# Patient Record
Sex: FEMALE | Race: WHITE | Employment: PART TIME | URBAN - METROPOLITAN AREA
[De-identification: names, ages, dates, MRNs, and addresses within clinical notes are randomized per-mention and may not be internally consistent; named-entity substitution may affect disease eponyms.]

---

## 2017-08-01 ENCOUNTER — HOSPITAL ENCOUNTER (OUTPATIENT)
Age: 57
Discharge: HOME | End: 2017-08-01
Payer: MEDICAID

## 2017-08-01 LAB — RUBV IGG SER QL: 44.4 IU/ML

## 2017-08-01 PROCEDURE — 86762 RUBELLA ANTIBODY: CPT

## 2017-08-01 PROCEDURE — 86481 TB AG RESPONSE T-CELL SUSP: CPT

## 2017-08-01 PROCEDURE — 86765 RUBEOLA ANTIBODY: CPT

## 2017-08-01 PROCEDURE — 86787 VARICELLA-ZOSTER ANTIBODY: CPT

## 2017-08-01 PROCEDURE — 86735 MUMPS ANTIBODY: CPT

## 2017-08-03 LAB — MEASLES IMMUNE (IGG): 1.13

## 2017-08-04 LAB
MUV IGG SER QL: 4.25
T-SPOT TB TEST: NORMAL
VZV IGG SER QL IA: 3.7

## 2017-08-07 ENCOUNTER — HOSPITAL ENCOUNTER (OUTPATIENT)
Age: 57
Discharge: HOME | End: 2017-08-07
Payer: MEDICAID

## 2017-08-07 PROCEDURE — 86481 TB AG RESPONSE T-CELL SUSP: CPT

## 2017-08-10 LAB — T-SPOT TB TEST: NORMAL

## 2017-08-30 ENCOUNTER — HOSPITAL ENCOUNTER (OUTPATIENT)
Age: 57
Setting detail: OBSERVATION
Discharge: HOME | End: 2017-08-31
Attending: EMERGENCY MEDICINE | Admitting: INTERNAL MEDICINE
Payer: MEDICAID

## 2017-08-30 ENCOUNTER — APPOINTMENT (OUTPATIENT)
Dept: GENERAL RADIOLOGY | Age: 57
End: 2017-08-30
Payer: MEDICAID

## 2017-08-30 DIAGNOSIS — R07.9 CHEST PAIN, UNSPECIFIED TYPE: Primary | ICD-10-CM

## 2017-08-30 PROBLEM — I10 HTN (HYPERTENSION): Status: ACTIVE | Noted: 2017-08-30

## 2017-08-30 PROBLEM — E03.9 HYPOTHYROIDISM: Status: ACTIVE | Noted: 2017-08-30

## 2017-08-30 PROBLEM — K21.9 GERD (GASTROESOPHAGEAL REFLUX DISEASE): Status: ACTIVE | Noted: 2017-08-30

## 2017-08-30 LAB
ABSOLUTE EOS #: 0.4 K/UL (ref 0–0.4)
ABSOLUTE LYMPH #: 1.4 K/UL (ref 1–4.8)
ABSOLUTE MONO #: 0.6 K/UL (ref 0.1–1.3)
AMPHETAMINE SCREEN URINE: NEGATIVE
ANION GAP SERPL CALCULATED.3IONS-SCNC: 15 MMOL/L (ref 9–17)
BARBITURATE SCREEN URINE: NEGATIVE
BASOPHILS # BLD: 1 %
BASOPHILS ABSOLUTE: 0.1 K/UL (ref 0–0.2)
BENZODIAZEPINE SCREEN, URINE: NEGATIVE
BUN BLDV-MCNC: 20 MG/DL (ref 6–20)
BUN/CREAT BLD: ABNORMAL (ref 9–20)
BUPRENORPHINE URINE: NORMAL
CALCIUM SERPL-MCNC: 9 MG/DL (ref 8.6–10.4)
CANNABINOID SCREEN URINE: NEGATIVE
CHLORIDE BLD-SCNC: 100 MMOL/L (ref 98–107)
CHOLESTEROL/HDL RATIO: 3
CHOLESTEROL: 172 MG/DL
CO2: 24 MMOL/L (ref 20–31)
COCAINE METABOLITE, URINE: NEGATIVE
CREAT SERPL-MCNC: 0.79 MG/DL (ref 0.5–0.9)
DIFFERENTIAL TYPE: NORMAL
EOSINOPHILS RELATIVE PERCENT: 6 %
GFR AFRICAN AMERICAN: >60 ML/MIN
GFR NON-AFRICAN AMERICAN: >60 ML/MIN
GFR SERPL CREATININE-BSD FRML MDRD: ABNORMAL ML/MIN/{1.73_M2}
GFR SERPL CREATININE-BSD FRML MDRD: ABNORMAL ML/MIN/{1.73_M2}
GLUCOSE BLD-MCNC: 102 MG/DL (ref 70–99)
HCT VFR BLD CALC: 41.1 % (ref 36–46)
HDLC SERPL-MCNC: 58 MG/DL
HEMOGLOBIN: 13.8 G/DL (ref 12–16)
LDL CHOLESTEROL: 87 MG/DL (ref 0–130)
LYMPHOCYTES # BLD: 21 %
MCH RBC QN AUTO: 29.1 PG (ref 26–34)
MCHC RBC AUTO-ENTMCNC: 33.7 G/DL (ref 31–37)
MCV RBC AUTO: 86.3 FL (ref 80–100)
MDMA URINE: NORMAL
METHADONE SCREEN, URINE: NEGATIVE
METHAMPHETAMINE, URINE: NORMAL
MONOCYTES # BLD: 9 %
MYOGLOBIN: 21 NG/ML (ref 25–58)
MYOGLOBIN: 21 NG/ML (ref 25–58)
OPIATES, URINE: NEGATIVE
OXYCODONE SCREEN URINE: NEGATIVE
PDW BLD-RTO: 13.7 % (ref 11.5–14.9)
PHENCYCLIDINE, URINE: NEGATIVE
PLATELET # BLD: 267 K/UL (ref 150–450)
PLATELET ESTIMATE: NORMAL
PMV BLD AUTO: 8.2 FL (ref 6–12)
POTASSIUM SERPL-SCNC: 3.7 MMOL/L (ref 3.7–5.3)
PROPOXYPHENE, URINE: NORMAL
RBC # BLD: 4.77 M/UL (ref 4–5.2)
RBC # BLD: NORMAL 10*6/UL
SEG NEUTROPHILS: 63 %
SEGMENTED NEUTROPHILS ABSOLUTE COUNT: 4.1 K/UL (ref 1.3–9.1)
SODIUM BLD-SCNC: 139 MMOL/L (ref 135–144)
TEST INFORMATION: NORMAL
TRICYCLIC ANTIDEPRESSANTS, UR: NORMAL
TRIGL SERPL-MCNC: 135 MG/DL
TROPONIN INTERP: ABNORMAL
TROPONIN INTERP: ABNORMAL
TROPONIN INTERP: NORMAL
TROPONIN T: <0.03 NG/ML
VLDLC SERPL CALC-MCNC: NORMAL MG/DL (ref 1–30)
WBC # BLD: 6.5 K/UL (ref 3.5–11)
WBC # BLD: NORMAL 10*3/UL

## 2017-08-30 PROCEDURE — 36415 COLL VENOUS BLD VENIPUNCTURE: CPT

## 2017-08-30 PROCEDURE — 6360000002 HC RX W HCPCS: Performed by: STUDENT IN AN ORGANIZED HEALTH CARE EDUCATION/TRAINING PROGRAM

## 2017-08-30 PROCEDURE — 6370000000 HC RX 637 (ALT 250 FOR IP): Performed by: EMERGENCY MEDICINE

## 2017-08-30 PROCEDURE — 83036 HEMOGLOBIN GLYCOSYLATED A1C: CPT

## 2017-08-30 PROCEDURE — 96372 THER/PROPH/DIAG INJ SC/IM: CPT

## 2017-08-30 PROCEDURE — 85025 COMPLETE CBC W/AUTO DIFF WBC: CPT

## 2017-08-30 PROCEDURE — 83874 ASSAY OF MYOGLOBIN: CPT

## 2017-08-30 PROCEDURE — 80048 BASIC METABOLIC PNL TOTAL CA: CPT

## 2017-08-30 PROCEDURE — 99220 PR INITIAL OBSERVATION CARE/DAY 70 MINUTES: CPT | Performed by: INTERNAL MEDICINE

## 2017-08-30 PROCEDURE — 6370000000 HC RX 637 (ALT 250 FOR IP): Performed by: RADIOLOGY

## 2017-08-30 PROCEDURE — 2580000003 HC RX 258: Performed by: EMERGENCY MEDICINE

## 2017-08-30 PROCEDURE — 6370000000 HC RX 637 (ALT 250 FOR IP): Performed by: STUDENT IN AN ORGANIZED HEALTH CARE EDUCATION/TRAINING PROGRAM

## 2017-08-30 PROCEDURE — 99285 EMERGENCY DEPT VISIT HI MDM: CPT

## 2017-08-30 PROCEDURE — 93005 ELECTROCARDIOGRAM TRACING: CPT

## 2017-08-30 PROCEDURE — 84484 ASSAY OF TROPONIN QUANT: CPT

## 2017-08-30 PROCEDURE — G0378 HOSPITAL OBSERVATION PER HR: HCPCS

## 2017-08-30 PROCEDURE — 71020 XR CHEST STANDARD TWO VW: CPT

## 2017-08-30 PROCEDURE — 80061 LIPID PANEL: CPT

## 2017-08-30 PROCEDURE — 80307 DRUG TEST PRSMV CHEM ANLYZR: CPT

## 2017-08-30 RX ORDER — SODIUM CHLORIDE 0.9 % (FLUSH) 0.9 %
10 SYRINGE (ML) INJECTION EVERY 12 HOURS SCHEDULED
Status: DISCONTINUED | OUTPATIENT
Start: 2017-08-30 | End: 2017-08-30

## 2017-08-30 RX ORDER — ONDANSETRON 2 MG/ML
4 INJECTION INTRAMUSCULAR; INTRAVENOUS EVERY 6 HOURS PRN
Status: DISCONTINUED | OUTPATIENT
Start: 2017-08-30 | End: 2017-08-31 | Stop reason: HOSPADM

## 2017-08-30 RX ORDER — 0.9 % SODIUM CHLORIDE 0.9 %
250 INTRAVENOUS SOLUTION INTRAVENOUS ONCE
Status: DISCONTINUED | OUTPATIENT
Start: 2017-08-31 | End: 2017-08-31 | Stop reason: HOSPADM

## 2017-08-30 RX ORDER — LEVOTHYROXINE SODIUM 0.05 MG/1
50 TABLET ORAL DAILY
Status: DISCONTINUED | OUTPATIENT
Start: 2017-08-31 | End: 2017-08-31 | Stop reason: HOSPADM

## 2017-08-30 RX ORDER — SODIUM CHLORIDE 0.9 % (FLUSH) 0.9 %
10 SYRINGE (ML) INJECTION EVERY 12 HOURS SCHEDULED
Status: DISCONTINUED | OUTPATIENT
Start: 2017-08-30 | End: 2017-08-31 | Stop reason: HOSPADM

## 2017-08-30 RX ORDER — GENTAMICIN SULFATE 1 MG/G
1 CREAM TOPICAL 3 TIMES DAILY PRN
COMMUNITY

## 2017-08-30 RX ORDER — SODIUM CHLORIDE 0.9 % (FLUSH) 0.9 %
10 SYRINGE (ML) INJECTION PRN
Status: DISCONTINUED | OUTPATIENT
Start: 2017-08-30 | End: 2017-08-30

## 2017-08-30 RX ORDER — ACETAMINOPHEN 325 MG/1
650 TABLET ORAL EVERY 4 HOURS PRN
Status: DISCONTINUED | OUTPATIENT
Start: 2017-08-30 | End: 2017-08-30

## 2017-08-30 RX ORDER — ACETAMINOPHEN 325 MG/1
650 TABLET ORAL EVERY 4 HOURS PRN
Status: DISCONTINUED | OUTPATIENT
Start: 2017-08-30 | End: 2017-08-31 | Stop reason: HOSPADM

## 2017-08-30 RX ORDER — METOPROLOL TARTRATE 5 MG/5ML
2.5 INJECTION INTRAVENOUS PRN
Status: DISCONTINUED | OUTPATIENT
Start: 2017-08-31 | End: 2017-08-31 | Stop reason: SDUPTHER

## 2017-08-30 RX ORDER — 0.9 % SODIUM CHLORIDE 0.9 %
500 INTRAVENOUS SOLUTION INTRAVENOUS ONCE
Status: COMPLETED | OUTPATIENT
Start: 2017-08-30 | End: 2017-08-30

## 2017-08-30 RX ORDER — DULOXETIN HYDROCHLORIDE 60 MG/1
60 CAPSULE, DELAYED RELEASE ORAL DAILY
COMMUNITY

## 2017-08-30 RX ORDER — AMINOPHYLLINE DIHYDRATE 25 MG/ML
100 INJECTION, SOLUTION INTRAVENOUS
Status: DISCONTINUED | OUTPATIENT
Start: 2017-08-31 | End: 2017-08-31 | Stop reason: HOSPADM

## 2017-08-30 RX ORDER — PANTOPRAZOLE SODIUM 40 MG/1
40 GRANULE, DELAYED RELEASE ORAL DAILY
Status: DISCONTINUED | OUTPATIENT
Start: 2017-08-30 | End: 2017-08-30

## 2017-08-30 RX ORDER — LEVOFLOXACIN 500 MG/1
500 TABLET, FILM COATED ORAL DAILY
Status: DISCONTINUED | OUTPATIENT
Start: 2017-08-30 | End: 2017-08-31 | Stop reason: HOSPADM

## 2017-08-30 RX ORDER — FLUTICASONE PROPIONATE 50 MCG
2 SPRAY, SUSPENSION (ML) NASAL DAILY
Status: DISCONTINUED | OUTPATIENT
Start: 2017-08-30 | End: 2017-08-31 | Stop reason: HOSPADM

## 2017-08-30 RX ORDER — OMEPRAZOLE 20 MG/1
40 CAPSULE, DELAYED RELEASE ORAL DAILY
COMMUNITY

## 2017-08-30 RX ORDER — PANTOPRAZOLE SODIUM 40 MG/1
40 TABLET, DELAYED RELEASE ORAL
Status: DISCONTINUED | OUTPATIENT
Start: 2017-08-31 | End: 2017-08-31 | Stop reason: HOSPADM

## 2017-08-30 RX ORDER — SODIUM CHLORIDE 0.9 % (FLUSH) 0.9 %
10 SYRINGE (ML) INJECTION PRN
Status: DISCONTINUED | OUTPATIENT
Start: 2017-08-30 | End: 2017-08-31 | Stop reason: SDUPTHER

## 2017-08-30 RX ORDER — SIMVASTATIN 40 MG
40 TABLET ORAL NIGHTLY
Status: DISCONTINUED | OUTPATIENT
Start: 2017-08-30 | End: 2017-08-31 | Stop reason: HOSPADM

## 2017-08-30 RX ORDER — LISINOPRIL 20 MG/1
20 TABLET ORAL DAILY
Status: DISCONTINUED | OUTPATIENT
Start: 2017-08-30 | End: 2017-08-31 | Stop reason: HOSPADM

## 2017-08-30 RX ORDER — ASPIRIN 81 MG/1
324 TABLET, CHEWABLE ORAL ONCE
Status: COMPLETED | OUTPATIENT
Start: 2017-08-30 | End: 2017-08-30

## 2017-08-30 RX ORDER — NITROGLYCERIN 0.4 MG/1
0.4 TABLET SUBLINGUAL EVERY 5 MIN PRN
Status: DISCONTINUED | OUTPATIENT
Start: 2017-08-31 | End: 2017-08-31 | Stop reason: SDUPTHER

## 2017-08-30 RX ADMIN — LEVOFLOXACIN 500 MG: 500 TABLET, FILM COATED ORAL at 21:22

## 2017-08-30 RX ADMIN — ENOXAPARIN SODIUM 30 MG: 30 INJECTION SUBCUTANEOUS at 21:22

## 2017-08-30 RX ADMIN — LISINOPRIL 20 MG: 20 TABLET ORAL at 21:23

## 2017-08-30 RX ADMIN — SIMVASTATIN 40 MG: 40 TABLET, FILM COATED ORAL at 21:22

## 2017-08-30 RX ADMIN — FLUTICASONE PROPIONATE 2 SPRAY: 50 SPRAY, METERED NASAL at 21:26

## 2017-08-30 RX ADMIN — SODIUM CHLORIDE 500 ML: 9 INJECTION, SOLUTION INTRAVENOUS at 17:40

## 2017-08-30 RX ADMIN — ASPIRIN 81 MG 324 MG: 81 TABLET ORAL at 13:12

## 2017-08-30 RX ADMIN — ACETAMINOPHEN 650 MG: 325 TABLET ORAL at 21:22

## 2017-08-30 ASSESSMENT — ENCOUNTER SYMPTOMS
BACK PAIN: 0
SHORTNESS OF BREATH: 0
NAUSEA: 0
ABDOMINAL PAIN: 0
EYE PAIN: 0
COUGH: 0
VOMITING: 0
SORE THROAT: 0
DIARRHEA: 0

## 2017-08-30 ASSESSMENT — PAIN DESCRIPTION - PAIN TYPE: TYPE: ACUTE PAIN

## 2017-08-30 ASSESSMENT — PAIN SCALES - GENERAL
PAINLEVEL_OUTOF10: 0
PAINLEVEL_OUTOF10: 3
PAINLEVEL_OUTOF10: 3

## 2017-08-30 ASSESSMENT — PAIN DESCRIPTION - LOCATION: LOCATION: CHEST

## 2017-08-31 ENCOUNTER — APPOINTMENT (OUTPATIENT)
Dept: NUCLEAR MEDICINE | Age: 57
End: 2017-08-31
Payer: MEDICAID

## 2017-08-31 VITALS
OXYGEN SATURATION: 99 % | DIASTOLIC BLOOD PRESSURE: 57 MMHG | TEMPERATURE: 97.8 F | SYSTOLIC BLOOD PRESSURE: 139 MMHG | HEIGHT: 63 IN | HEART RATE: 83 BPM | WEIGHT: 235.89 LBS | BODY MASS INDEX: 41.8 KG/M2 | RESPIRATION RATE: 16 BRPM

## 2017-08-31 LAB
ESTIMATED AVERAGE GLUCOSE: 120 MG/DL
HBA1C MFR BLD: 5.8 % (ref 4–6)
LV EF: 69 %
LVEF MODALITY: NORMAL

## 2017-08-31 PROCEDURE — 6360000002 HC RX W HCPCS: Performed by: STUDENT IN AN ORGANIZED HEALTH CARE EDUCATION/TRAINING PROGRAM

## 2017-08-31 PROCEDURE — 78452 HT MUSCLE IMAGE SPECT MULT: CPT

## 2017-08-31 PROCEDURE — A9500 TC99M SESTAMIBI: HCPCS | Performed by: RADIOLOGY

## 2017-08-31 PROCEDURE — 3430000000 HC RX DIAGNOSTIC RADIOPHARMACEUTICAL: Performed by: RADIOLOGY

## 2017-08-31 PROCEDURE — 6370000000 HC RX 637 (ALT 250 FOR IP): Performed by: RADIOLOGY

## 2017-08-31 PROCEDURE — 2580000003 HC RX 258: Performed by: INTERNAL MEDICINE

## 2017-08-31 PROCEDURE — 93017 CV STRESS TEST TRACING ONLY: CPT

## 2017-08-31 PROCEDURE — 6360000002 HC RX W HCPCS: Performed by: INTERNAL MEDICINE

## 2017-08-31 PROCEDURE — 3430000000 HC RX DIAGNOSTIC RADIOPHARMACEUTICAL: Performed by: INTERNAL MEDICINE

## 2017-08-31 PROCEDURE — 6370000000 HC RX 637 (ALT 250 FOR IP): Performed by: NURSE PRACTITIONER

## 2017-08-31 PROCEDURE — A9500 TC99M SESTAMIBI: HCPCS | Performed by: INTERNAL MEDICINE

## 2017-08-31 PROCEDURE — 99217 PR OBSERVATION CARE DISCHARGE MANAGEMENT: CPT | Performed by: INTERNAL MEDICINE

## 2017-08-31 PROCEDURE — 96372 THER/PROPH/DIAG INJ SC/IM: CPT

## 2017-08-31 PROCEDURE — G0378 HOSPITAL OBSERVATION PER HR: HCPCS

## 2017-08-31 PROCEDURE — 6370000000 HC RX 637 (ALT 250 FOR IP): Performed by: STUDENT IN AN ORGANIZED HEALTH CARE EDUCATION/TRAINING PROGRAM

## 2017-08-31 RX ORDER — DICLOFENAC SODIUM 75 MG/1
75 TABLET, DELAYED RELEASE ORAL 2 TIMES DAILY
Status: DISCONTINUED | OUTPATIENT
Start: 2017-08-31 | End: 2017-08-31 | Stop reason: HOSPADM

## 2017-08-31 RX ORDER — NITROGLYCERIN 0.4 MG/1
0.4 TABLET SUBLINGUAL EVERY 5 MIN PRN
Status: DISCONTINUED | OUTPATIENT
Start: 2017-08-31 | End: 2017-08-31 | Stop reason: HOSPADM

## 2017-08-31 RX ORDER — SODIUM CHLORIDE 0.9 % (FLUSH) 0.9 %
10 SYRINGE (ML) INJECTION PRN
Status: DISCONTINUED | OUTPATIENT
Start: 2017-08-31 | End: 2017-08-31 | Stop reason: HOSPADM

## 2017-08-31 RX ORDER — LEVOFLOXACIN 500 MG/1
500 TABLET, FILM COATED ORAL DAILY
Qty: 5 TABLET | Refills: 0 | Status: SHIPPED | OUTPATIENT
Start: 2017-09-01 | End: 2017-09-06

## 2017-08-31 RX ORDER — BUMETANIDE 1 MG/1
1 TABLET ORAL DAILY
Status: DISCONTINUED | OUTPATIENT
Start: 2017-08-31 | End: 2017-08-31 | Stop reason: HOSPADM

## 2017-08-31 RX ORDER — 0.9 % SODIUM CHLORIDE 0.9 %
250 INTRAVENOUS SOLUTION INTRAVENOUS ONCE
Status: DISCONTINUED | OUTPATIENT
Start: 2017-08-31 | End: 2017-08-31 | Stop reason: HOSPADM

## 2017-08-31 RX ORDER — DULOXETIN HYDROCHLORIDE 60 MG/1
60 CAPSULE, DELAYED RELEASE ORAL DAILY
Status: DISCONTINUED | OUTPATIENT
Start: 2017-08-31 | End: 2017-08-31 | Stop reason: HOSPADM

## 2017-08-31 RX ORDER — AMINOPHYLLINE DIHYDRATE 25 MG/ML
100 INJECTION, SOLUTION INTRAVENOUS
Status: COMPLETED | OUTPATIENT
Start: 2017-08-31 | End: 2017-08-31

## 2017-08-31 RX ORDER — METOPROLOL TARTRATE 5 MG/5ML
2.5 INJECTION INTRAVENOUS PRN
Status: DISCONTINUED | OUTPATIENT
Start: 2017-08-31 | End: 2017-08-31 | Stop reason: HOSPADM

## 2017-08-31 RX ADMIN — LISINOPRIL 20 MG: 20 TABLET ORAL at 11:09

## 2017-08-31 RX ADMIN — ENOXAPARIN SODIUM 30 MG: 30 INJECTION SUBCUTANEOUS at 11:13

## 2017-08-31 RX ADMIN — BUMETANIDE 1 MG: 1 TABLET ORAL at 11:10

## 2017-08-31 RX ADMIN — AMINOPHYLLINE 50 MG: 25 INJECTION, SOLUTION INTRAVENOUS at 09:20

## 2017-08-31 RX ADMIN — TETRAKIS(2-METHOXYISOBUTYLISOCYANIDE)COPPER(I) TETRAFLUOROBORATE 34 MILLICURIE: 1 INJECTION, POWDER, LYOPHILIZED, FOR SOLUTION INTRAVENOUS at 09:14

## 2017-08-31 RX ADMIN — TETRAKIS(2-METHOXYISOBUTYLISOCYANIDE)COPPER(I) TETRAFLUOROBORATE 11.17 MILLICURIE: 1 INJECTION, POWDER, LYOPHILIZED, FOR SOLUTION INTRAVENOUS at 07:27

## 2017-08-31 RX ADMIN — LEVOFLOXACIN 500 MG: 500 TABLET, FILM COATED ORAL at 11:09

## 2017-08-31 RX ADMIN — FLUTICASONE PROPIONATE 2 SPRAY: 50 SPRAY, METERED NASAL at 11:09

## 2017-08-31 RX ADMIN — Medication 10 ML: at 09:11

## 2017-08-31 RX ADMIN — REGADENOSON 0.4 MG: 0.08 INJECTION, SOLUTION INTRAVENOUS at 09:10

## 2017-09-01 LAB
EKG ATRIAL RATE: 87 BPM
EKG P AXIS: 39 DEGREES
EKG P-R INTERVAL: 178 MS
EKG Q-T INTERVAL: 384 MS
EKG QRS DURATION: 84 MS
EKG QTC CALCULATION (BAZETT): 462 MS
EKG R AXIS: 50 DEGREES
EKG T AXIS: 50 DEGREES
EKG VENTRICULAR RATE: 87 BPM

## 2018-10-25 ENCOUNTER — HOSPITAL ENCOUNTER (OUTPATIENT)
Age: 58
Setting detail: SPECIMEN
Discharge: HOME | End: 2018-10-25
Payer: COMMERCIAL

## 2018-10-27 LAB
CULTURE: ABNORMAL
DIRECT EXAM: ABNORMAL
Lab: ABNORMAL
SPECIMEN DESCRIPTION: ABNORMAL
STATUS: ABNORMAL

## 2018-10-29 LAB — SURGICAL PATHOLOGY REPORT: NORMAL

## 2022-10-25 ENCOUNTER — OFFICE VISIT (OUTPATIENT)
Dept: GASTROENTEROLOGY | Age: 62
End: 2022-10-25
Payer: COMMERCIAL

## 2022-10-25 VITALS
HEART RATE: 93 BPM | SYSTOLIC BLOOD PRESSURE: 114 MMHG | DIASTOLIC BLOOD PRESSURE: 72 MMHG | BODY MASS INDEX: 41.99 KG/M2 | WEIGHT: 237 LBS | HEIGHT: 63 IN

## 2022-10-25 DIAGNOSIS — K58.2 IRRITABLE BOWEL SYNDROME WITH BOTH CONSTIPATION AND DIARRHEA: ICD-10-CM

## 2022-10-25 DIAGNOSIS — R10.12 ABDOMINAL PAIN, LEFT UPPER QUADRANT: ICD-10-CM

## 2022-10-25 DIAGNOSIS — F32.A DEPRESSION, UNSPECIFIED DEPRESSION TYPE: ICD-10-CM

## 2022-10-25 DIAGNOSIS — R19.4 BOWEL HABIT CHANGES: Primary | ICD-10-CM

## 2022-10-25 DIAGNOSIS — K51.00 ULCERATIVE PANCOLITIS WITHOUT COMPLICATION (HCC): ICD-10-CM

## 2022-10-25 PROCEDURE — 3074F SYST BP LT 130 MM HG: CPT | Performed by: INTERNAL MEDICINE

## 2022-10-25 PROCEDURE — 99205 OFFICE O/P NEW HI 60 MIN: CPT | Performed by: INTERNAL MEDICINE

## 2022-10-25 PROCEDURE — 3078F DIAST BP <80 MM HG: CPT | Performed by: INTERNAL MEDICINE

## 2022-10-25 RX ORDER — VANCOMYCIN HYDROCHLORIDE 125 MG/1
CAPSULE ORAL
COMMUNITY

## 2022-10-25 RX ORDER — PREDNISONE 50 MG/1
TABLET ORAL
COMMUNITY

## 2022-10-25 RX ORDER — FERROUS SULFATE 325(65) MG
TABLET ORAL
COMMUNITY
Start: 2022-06-29

## 2022-10-25 RX ORDER — GABAPENTIN 300 MG/1
CAPSULE ORAL
COMMUNITY
Start: 2021-04-05

## 2022-10-25 RX ORDER — HYDROCHLOROTHIAZIDE 25 MG/1
TABLET ORAL
COMMUNITY
Start: 2018-07-24

## 2022-10-25 RX ORDER — LISINOPRIL 30 MG/1
TABLET ORAL
COMMUNITY
Start: 2018-05-03

## 2022-10-25 RX ORDER — ASCORBIC ACID 500 MG
TABLET ORAL
COMMUNITY
Start: 2022-10-21

## 2022-10-25 RX ORDER — ATORVASTATIN CALCIUM 40 MG/1
TABLET, FILM COATED ORAL
COMMUNITY
Start: 2022-06-29

## 2022-10-25 RX ORDER — FUROSEMIDE 40 MG/1
TABLET ORAL
COMMUNITY
Start: 2022-10-21

## 2022-10-25 RX ORDER — BUSPIRONE HYDROCHLORIDE 10 MG/1
TABLET ORAL
COMMUNITY
Start: 2021-04-05

## 2022-10-25 RX ORDER — POTASSIUM CHLORIDE 20 MEQ/1
TABLET, EXTENDED RELEASE ORAL
COMMUNITY

## 2022-10-25 RX ORDER — DULOXETIN HYDROCHLORIDE 30 MG/1
CAPSULE, DELAYED RELEASE ORAL
COMMUNITY
Start: 2022-10-21

## 2022-10-25 ASSESSMENT — ENCOUNTER SYMPTOMS
RESPIRATORY NEGATIVE: 1
ALLERGIC/IMMUNOLOGIC NEGATIVE: 1
ABDOMINAL DISTENTION: 1
RECTAL PAIN: 1
NAUSEA: 1
TROUBLE SWALLOWING: 1
CONSTIPATION: 1
ABDOMINAL PAIN: 1
DIARRHEA: 1
SORE THROAT: 1

## 2022-10-25 NOTE — PROGRESS NOTES
Reason for Referral:   Grace Arciniega  [address]  [facility],  [address]    Chief Complaint   Patient presents with    Ulcerative Colitis     Pt states she was diagnosed in June, pt was on entyvio but was starting to develop immunity. Pt states she is having some fecal leakage        1. Bowel habit changes    2. Ulcerative pancolitis without complication ([address]    3. Irritable bowel syndrome with both constipation and diarrhea    4. Abdominal pain, left upper quadrant    5. Depression, unspecified depression type            HISTORY OF PRESENT ILLNESS:   Patient seen along with her . Patient chief complaints are abdominal pain, bloating, nausea. Her abdominal pain is mostly in the left upper quadrant, pelvic area. The pain is like a \"ache\" and present all the time. She is not clear what makes the pain worse and how she gets relief. On further discussion, it is not clear whether she has a nocturnal symptoms. No radiation of this discomfort. Not related to food intake. She has pain both on fasting state and also postprandial state. She has early satiety. Has not nausea without significant emesis. No fever or chills. No urinary symptoms. No pelvic discharge. Has a significant abdominal bloating gurgling sounds. She has these symptoms in the last 4 to 5 years. She denies intermittent symptoms. He has fair appetite. Her weight has been stable. However she states her weight fluctuates. She does have loose bowels. Typically has 4-5 bowel movements a day sometimes 1-2 bowel movements a day. In the past she has mild constipation. No nocturnal bowel movements. No hematochezia. Bowel movements are small to moderate amount without foul odor. Not related to particular foods. No history of lactose intolerance or other food allergies. However she states that she has bowel movements in the postprandial state.     Not known to have liver disease, pancreatitis in the past.  Denies GERD symptoms. No dysphagia. On reviewing her old records from [facility] it looks like this patient was in the hospital in December 2021 and at that time she had loose bowels. Further details regarding the loose bowels onset duration not known. She had investigations done including colonoscopy and was described to have pancolitis. It was thought that patient has ulcerative colitis and was started on steroids and following that she was started on Entyvio. Patient continued Entyvio until April 2022 and in the last 5 months she is not taking any medications for her \"ulcerative colitis\". Patient bowel movements did not change. No hematochezia. Also on reviewing the records this patient had a colonoscopy done in 2012 and at that time it was felt that patient may have mild colitis, was not categorized. Again in 2018 she had a colonoscopy done and described to be normal exam.  Patient had 2-3 EGDs done in the past and was nonspecific. I do not have lab reports from [facility].    Patient also states that she has significant stress. She has been on Cymbalta. Also patient has hypothyroidism and she is on thyroid supplements. Her other medications reviewed. Mother family members has inflammatory bowel disease. Patient denies symptoms suggestive of extraintestinal manifestation of IBD. Patient has been overweight all along. She lost her job in summer 2022 and lost her insurance. Patient does have chronic back problems. She denies any gait abnormalities. No history of trauma. He denies taking NSAIDs. No cardiopulmonary issues except hypertension etc.    Past Medical,Family, and Social History reviewed and does contribute to the patient presentingcondition. Patient's PMH/PSH,SH,PSYCH Hx, MEDs, ALLERGIES, and ROS were all reviewed and updated in the appropriate sections.     PAST MEDICAL HISTORY:  Past Medical History:   Diagnosis Date    Arthritis     Depression Gastric reflux     Headache(784.0)     Hypertension     Thyroid disease        Past Surgical History:   Procedure Laterality Date    CHOLECYSTECTOMY      HYSTERECTOMY (CERVIX STATUS UNKNOWN)      THYROIDECTOMY      TOTAL KNEE ARTHROPLASTY Bilateral        CURRENT MEDICATIONS:    Current Outpatient Medications:     metFORMIN (GLUCOPHAGE) 500 MG tablet, metformin 500 mg tablet  TAKE 1 TABLET BY MOUTH TWICE DAILY, Disp: , Rfl:     DULoxetine (CYMBALTA) 30 MG extended release capsule, , Disp: , Rfl:     ascorbic acid (VITAMIN C) 500 MG tablet, Vitamin C 500 mg tablet  TAKE 1/2 (ONE-HALF) TABLET BY MOUTH ONCE DAILY IN THE MORNING WITH IRON MEDICATION, Disp: , Rfl:     atorvastatin (LIPITOR) 40 MG tablet, atorvastatin 40 mg tablet  TAKE 1 TABLET BY MOUTH NIGHTLY, Disp: , Rfl:     busPIRone (BUSPAR) 10 MG tablet, buspirone 10 mg tablet  TAKE 1 TABLET BY MOUTH THREE TIMES DAILY, Disp: , Rfl:     furosemide (LASIX) 40 MG tablet, , Disp: , Rfl:     gabapentin (NEURONTIN) 300 MG capsule, 1 capsule, Disp: , Rfl:     hydroCHLOROthiazide (HYDRODIURIL) 25 MG tablet, hydrochlorothiazide 25 mg tablet  TAKE 1 TABLET BY MOUTH ONCE DAILY, Disp: , Rfl:     lisinopril (PRINIVIL;ZESTRIL) 30 MG tablet, lisinopril 30 mg tablet  TAKE 1 TABLET BY MOUTH ONCE DAILY, Disp: , Rfl:     vancomycin (VANCOCIN) 125 MG capsule, vancomycin 125 mg capsule  TAKE 1 CAPSULE BY MOUTH FOUR TIMES DAILY FOR 14 DAYS THEN TAKE 1 CAPSULE TWICE DAILY FOR 7 DAYS THEN TAKE 1 CAPSULE ONCE DAILY FOR 7 DAYS TH, Disp: , Rfl:     predniSONE (DELTASONE) 50 MG tablet, prednisone 50 mg tablet  TAKE 1 TABLET BY MOUTH ONCE DAILY, Disp: , Rfl:     potassium chloride (KLOR-CON M) 20 MEQ extended release tablet, potassium chloride ER 20 mEq tablet,extended release  TAKE 1 TABLET BY MOUTH ONCE DAILY, Disp: , Rfl:     ferrous sulfate (IRON 325) 325 (65 Fe) MG tablet, FeroSul 325 mg (65 mg iron) tablet  TAKE 1 TABLET BY MOUTH IN THE MORNING WITH VITAMIN C TABLETS, Disp: , Rfl: omeprazole (PRILOSEC) 20 MG delayed release capsule, Take 40 mg by mouth daily, Disp: , Rfl:     DULoxetine (CYMBALTA) 60 MG extended release capsule, Take 60 mg by mouth daily, Disp: , Rfl:     DICLOFENAC PO, Take 75 mg by mouth 2 times daily With food, Disp: , Rfl:     gentamicin (GARAMYCIN) 0.1 % cream, Apply 1 Dose topically 3 times daily as needed (Apply small amount to affected area 3 times a day as needed.) Apply topically 3 times daily. , Disp: , Rfl:     quinapril (ACCUPRIL) 20 MG tablet, Take 20 mg by mouth nightly., Disp: , Rfl:     levothyroxine (SYNTHROID) 50 MCG tablet, Take 50 mcg by mouth Daily. , Disp: , Rfl:     bumetanide (BUMEX) 1 MG tablet, Take 1 mg by mouth daily. , Disp: , Rfl:     simvastatin (ZOCOR) 40 MG tablet, Take 40 mg by mouth nightly., Disp: , Rfl:     ALLERGIES:   Allergies   Allergen Reactions    Bactrim [Sulfamethoxazole-Trimethoprim] Hives    Pcn [Penicillins] Hives    Prozac [Fluoxetine Hcl] Hives    Codeine Nausea And Vomiting       FAMILY HISTORY:       Problem Relation Age of Onset    Diabetes Mother     Heart Failure Mother     Stroke Father          SOCIAL HISTORY:   Social History     Socioeconomic History    Marital status:      Spouse name: Not on file    Number of children: Not on file    Years of education: Not on file    Highest education level: Not on file   Occupational History    Not on file   Tobacco Use    Smoking status: Never    Smokeless tobacco: Never   Substance and Sexual Activity    Alcohol use:  Yes     Alcohol/week: 0.0 standard drinks    Drug use: No    Sexual activity: Not on file   Other Topics Concern    Not on file   Social History Narrative    Not on file     Social Determinants of Health     Financial Resource Strain: Not on file   Food Insecurity: Not on file   Transportation Needs: Not on file   Physical Activity: Not on file   Stress: Not on file   Social Connections: Not on file   Intimate Partner Violence: Not on file   Housing Stability: Not on file       REVIEW OF SYSTEMS:       Review of Systems   Constitutional:  Positive for appetite change, fatigue and unexpected weight change (during colitis flare). HENT:  Positive for sore throat and trouble swallowing. Eyes:  Positive for visual disturbance (glasses). Respiratory: Negative. Cardiovascular: Negative. Gastrointestinal:  Positive for abdominal distention, abdominal pain, constipation, diarrhea, nausea and rectal pain. Endocrine: Negative. Genitourinary: Negative. Musculoskeletal: Negative. Skin: Negative. Allergic/Immunologic: Negative. Neurological: Negative. Hematological:  Bruises/bleeds easily. Psychiatric/Behavioral: Negative. PHYSICAL EXAMINATION: Vital signs reviewed per the nursing documentation. /72   Pulse 93   Ht 5' 3\" (1.6 m)   Wt 237 lb (107.5 kg)   BMI 41.98 kg/m²   Body mass index is 41.98 kg/m². Physical Exam  Vitals and nursing note reviewed. Constitutional:       Appearance: She is well-developed. Comments: Moderately overweight patient. HENT:      Head: Normocephalic and atraumatic. Eyes:      General: No scleral icterus. Conjunctiva/sclera: Conjunctivae normal.      Pupils: Pupils are equal, round, and reactive to light. Neck:      Thyroid: No thyromegaly. Vascular: No hepatojugular reflux or JVD. Trachea: No tracheal deviation. Cardiovascular:      Rate and Rhythm: Normal rate and regular rhythm. Heart sounds: Normal heart sounds. Pulmonary:      Effort: Pulmonary effort is normal. No respiratory distress. Breath sounds: Normal breath sounds. No wheezing or rales. Abdominal:      General: Bowel sounds are normal. There is no distension. Palpations: Abdomen is soft. There is no hepatomegaly or mass. Tenderness: There is no abdominal tenderness. There is no rebound. Hernia: No hernia is present. Comments: Obese abdomen.   Difficult to examine the abdomen because of body habitus. Musculoskeletal:         General: No tenderness. Cervical back: Normal range of motion and neck supple. Comments: No joint swelling   Lymphadenopathy:      Cervical: No cervical adenopathy. Skin:     General: Skin is warm. Findings: No bruising, ecchymosis, erythema or rash. Neurological:      Mental Status: She is alert and oriented to person, place, and time. Cranial Nerves: No cranial nerve deficit. Psychiatric:         Thought Content: Thought content normal.         LABORATORY DATA: Reviewed  Lab Results   Component Value Date    WBC 6.5 08/30/2017    HGB 13.8 08/30/2017    HCT 41.1 08/30/2017    MCV 86.3 08/30/2017     08/30/2017     08/30/2017    K 3.7 08/30/2017     08/30/2017    CO2 24 08/30/2017    BUN 20 08/30/2017    CREATININE 0.79 08/30/2017         Lab Results   Component Value Date    RBC 4.77 08/30/2017    HGB 13.8 08/30/2017    MCV 86.3 08/30/2017    MCH 29.1 08/30/2017    MCHC 33.7 08/30/2017    RDW 13.7 08/30/2017    MPV 8.2 08/30/2017    BASOPCT 1 08/30/2017    LYMPHSABS 1.40 08/30/2017    MONOSABS 0.60 08/30/2017    NEUTROABS 4.10 08/30/2017    EOSABS 0.40 08/30/2017    BASOSABS 0.10 08/30/2017         DIAGNOSTIC TESTING:     No results found. IMPRESSION: Ms. Teetee Chavez is a 60 y.o. female with    Assessment:  1. Bowel habit changes    2. Ulcerative pancolitis without complication ([address]    3. Irritable bowel syndrome with both constipation and diarrhea    4. Abdominal pain, left upper quadrant    5. Depression, unspecified depression type        Plan: This patient was diagnosed to have ulcerative colitis in December 2021 and was treated for few months. However MR enterography in February 2022 described normal mucosa in the bowels. Given treatment for ulcerative colitis only 4 to 5 weeks, the mucosa described to be normal basing on the MR E. Given this history I am very skeptical regarding diagnosis.   I am wondering whether patient had infectious colitis in December 2021. Also to be noted that she had a couple of colonoscopies done in the past and describe it to be normal mucosa. Also to be noted that patient bowel habit did not change much. I had long discussion with the patient and her  regarding various possibilities including infectious colitis which was resolved or possibility of ulcerative colitis and after 4 to 5 weeks of therapy went to remission. However after 5 to 6 months no patient did not have flareup of symptoms. Her main issue appears to be left upper quadrant and pelvic pains. Basing on the history and examination and the chronicity I doubt that this symptom is because of inflammatory bowel disease. Also discussed regarding depression anxiety which can trigger some of her IBS symptoms. Advised to discuss with PCP regarding management of these issues. They also had several questions regarding her symptoms and various possibilities which were explained. I will order inflammatory markers and stool studies. Advised to see me in the next 4 weeks. At that time we may have to repeat colonoscopy and decide regarding further management of this patient. I am wondering whether depression, stressful lifestyle may be contributing to some of her symptoms. Also I strongly think that this patient may have IBS. She had constipation in the past and at present she has loose bowels. Discussed with them regarding this possibility, benign nature of this entity and reassured. Advised to see me in the next 4 weeks. After discussion, they understood and verbalized agreement. I spent about 60 minutes with the patient . Also spent at least 15 to 20 minutes to review the records from [facility] 206 30 minutes providing patient education and counseling. Thank you for allowing me to participate in the care of Ms. Zeeshan Tovar.  For any further questions please do not hesitate to contact me. Note is dictated utilizing voice recognition software. Unfortunately this leads to occasional typographical errors. Please contact our office if you have any questions. I have reviewed and agree with the MA/TANYA Viveros MD, [facility]  Board Certified in Gastroenterology and [address] [place] [address] Gastroenterology  Office #: [phone]

## 2022-10-31 LAB
ALBUMIN SERPL-MCNC: 3.9 G/DL
ALP BLD-CCNC: 130 U/L
ALT SERPL-CCNC: 19 U/L
ANION GAP SERPL CALCULATED.3IONS-SCNC: 6 MMOL/L
AST SERPL-CCNC: 16 U/L
BASOPHILS ABSOLUTE: 0.1 /ΜL
BASOPHILS RELATIVE PERCENT: 1 %
BILIRUB SERPL-MCNC: 0.7 MG/DL (ref 0.1–1.4)
BUN BLDV-MCNC: 18 MG/DL
C-REACTIVE PROTEIN: 1.45
CALCIUM SERPL-MCNC: 9.3 MG/DL
CHLORIDE BLD-SCNC: 98 MMOL/L
CO2: 32 MMOL/L
CREAT SERPL-MCNC: 0.96 MG/DL
EOSINOPHILS ABSOLUTE: 0.3 /ΜL
EOSINOPHILS RELATIVE PERCENT: 5 %
GFR CALCULATED: 59
GLUCOSE BLD-MCNC: 107 MG/DL
HCT VFR BLD CALC: 39 % (ref 36–46)
HEMOGLOBIN: 12.9 G/DL (ref 12–16)
LIPASE: 35 UNITS/L
LYMPHOCYTES ABSOLUTE: 1.5 /ΜL
LYMPHOCYTES RELATIVE PERCENT: 23 %
MCH RBC QN AUTO: 28 PG
MCHC RBC AUTO-ENTMCNC: 33.2 G/DL
MCV RBC AUTO: 84 FL
MONOCYTES ABSOLUTE: 0.6 /ΜL
MONOCYTES RELATIVE PERCENT: 10 %
NEUTROPHILS ABSOLUTE: 4.1 /ΜL
NEUTROPHILS RELATIVE PERCENT: 61 %
PDW BLD-RTO: 14.9 %
PLATELET # BLD: 325 K/ΜL
PMV BLD AUTO: 8.2 FL
POTASSIUM SERPL-SCNC: 4.1 MMOL/L
RBC # BLD: 4.62 10^6/ΜL
SODIUM BLD-SCNC: 136 MMOL/L
TISSUE TRANSGLUTAMINASE IGA: NORMAL
TOTAL PROTEIN: 6.6
TSH SERPL DL<=0.05 MIU/L-ACNC: 2.38 UIU/ML
WBC # BLD: 6.7 10^3/ML

## 2022-11-16 DIAGNOSIS — R19.4 BOWEL HABIT CHANGES: ICD-10-CM

## 2022-11-16 DIAGNOSIS — K51.00 ULCERATIVE PANCOLITIS WITHOUT COMPLICATION (HCC): ICD-10-CM

## 2023-01-18 ENCOUNTER — OFFICE VISIT (OUTPATIENT)
Dept: GASTROENTEROLOGY | Age: 63
End: 2023-01-18

## 2023-01-18 VITALS
HEART RATE: 76 BPM | HEIGHT: 63 IN | DIASTOLIC BLOOD PRESSURE: 67 MMHG | SYSTOLIC BLOOD PRESSURE: 110 MMHG | BODY MASS INDEX: 42.17 KG/M2 | WEIGHT: 238 LBS

## 2023-01-18 DIAGNOSIS — K58.2 IRRITABLE BOWEL SYNDROME WITH BOTH CONSTIPATION AND DIARRHEA: Primary | ICD-10-CM

## 2023-01-18 DIAGNOSIS — R19.4 BOWEL HABIT CHANGES: Primary | ICD-10-CM

## 2023-01-18 DIAGNOSIS — K58.2 IRRITABLE BOWEL SYNDROME WITH BOTH CONSTIPATION AND DIARRHEA: ICD-10-CM

## 2023-01-18 RX ORDER — POLYETHYLENE GLYCOL 3350, SODIUM SULFATE ANHYDROUS, SODIUM BICARBONATE, SODIUM CHLORIDE, POTASSIUM CHLORIDE 236; 22.74; 6.74; 5.86; 2.97 G/4L; G/4L; G/4L; G/4L; G/4L
4 POWDER, FOR SOLUTION ORAL ONCE
Qty: 4000 ML | Refills: 0 | Status: SHIPPED | OUTPATIENT
Start: 2023-01-18 | End: 2023-01-18

## 2023-01-18 ASSESSMENT — ENCOUNTER SYMPTOMS
ABDOMINAL PAIN: 1
CONSTIPATION: 1
ALLERGIC/IMMUNOLOGIC NEGATIVE: 1
RESPIRATORY NEGATIVE: 1
DIARRHEA: 1

## 2023-01-18 NOTE — PROGRESS NOTES
GI CLINIC FOLLOW UP    INTERVAL HISTORY:   No referring provider defined for this encounter. Chief Complaint   Patient presents with    Diarrhea     Pt here for lab f/u. Pt states still having abd pain, constipation and diarrhea. Pt states having bad diarrhea and was going up to 6 times a day        HISTORY OF PRESENT ILLNESS:     Patient being seen for follow-up labs. Patient has questionable history of ulcerative colitis. Stool studies, inflammatory markers were WNL. Reports alternating constipation with diarrhea. Recently had constipation likely 2/2 opioid medications and Imodium. Reports yesterday she finally had BM after 4 days and reports diarrhea. Denies any melena, hematochezia. Has some bloating after meals. Has chronic abdominal discomfort. Reports aching pain. No aggravating or alleviating factors. Past Medical,Family, and Social History reviewed and does contribute to the patient presentingcondition. Patient's PMH/PSH,SH,PSYCH Hx, MEDs, ALLERGIES, and ROS were all reviewed and updated in the appropriate sections.     PAST MEDICAL HISTORY:  Past Medical History:   Diagnosis Date    Arthritis     Depression     Gastric reflux     Headache(784.0)     Hypertension     Thyroid disease        Past Surgical History:   Procedure Laterality Date    CHOLECYSTECTOMY      HYSTERECTOMY (CERVIX STATUS UNKNOWN)      THYROIDECTOMY      TOTAL KNEE ARTHROPLASTY Bilateral        CURRENT MEDICATIONS:    Current Outpatient Medications:     metFORMIN (GLUCOPHAGE) 500 MG tablet, metformin 500 mg tablet  TAKE 1 TABLET BY MOUTH TWICE DAILY, Disp: , Rfl:     DULoxetine (CYMBALTA) 30 MG extended release capsule, , Disp: , Rfl:     ascorbic acid (VITAMIN C) 500 MG tablet, Vitamin C 500 mg tablet  TAKE 1/2 (ONE-HALF) TABLET BY MOUTH ONCE DAILY IN THE MORNING WITH IRON MEDICATION, Disp: , Rfl:     atorvastatin (LIPITOR) 40 MG tablet, atorvastatin 40 mg tablet  TAKE 1 TABLET BY MOUTH NIGHTLY, Disp: , Rfl: busPIRone (BUSPAR) 10 MG tablet, buspirone 10 mg tablet  TAKE 1 TABLET BY MOUTH THREE TIMES DAILY, Disp: , Rfl:     furosemide (LASIX) 40 MG tablet, , Disp: , Rfl:     gabapentin (NEURONTIN) 300 MG capsule, 1 capsule, Disp: , Rfl:     hydroCHLOROthiazide (HYDRODIURIL) 25 MG tablet, hydrochlorothiazide 25 mg tablet  TAKE 1 TABLET BY MOUTH ONCE DAILY, Disp: , Rfl:     lisinopril (PRINIVIL;ZESTRIL) 30 MG tablet, lisinopril 30 mg tablet  TAKE 1 TABLET BY MOUTH ONCE DAILY, Disp: , Rfl:     vancomycin (VANCOCIN) 125 MG capsule, vancomycin 125 mg capsule  TAKE 1 CAPSULE BY MOUTH FOUR TIMES DAILY FOR 14 DAYS THEN TAKE 1 CAPSULE TWICE DAILY FOR 7 DAYS THEN TAKE 1 CAPSULE ONCE DAILY FOR 7 DAYS TH, Disp: , Rfl:     predniSONE (DELTASONE) 50 MG tablet, prednisone 50 mg tablet  TAKE 1 TABLET BY MOUTH ONCE DAILY, Disp: , Rfl:     potassium chloride (KLOR-CON M) 20 MEQ extended release tablet, potassium chloride ER 20 mEq tablet,extended release  TAKE 1 TABLET BY MOUTH ONCE DAILY, Disp: , Rfl:     ferrous sulfate (IRON 325) 325 (65 Fe) MG tablet, FeroSul 325 mg (65 mg iron) tablet  TAKE 1 TABLET BY MOUTH IN THE MORNING WITH VITAMIN C TABLETS, Disp: , Rfl:     omeprazole (PRILOSEC) 20 MG delayed release capsule, Take 40 mg by mouth daily, Disp: , Rfl:     DULoxetine (CYMBALTA) 60 MG extended release capsule, Take 60 mg by mouth daily, Disp: , Rfl:     DICLOFENAC PO, Take 75 mg by mouth 2 times daily With food, Disp: , Rfl:     gentamicin (GARAMYCIN) 0.1 % cream, Apply 1 Dose topically 3 times daily as needed (Apply small amount to affected area 3 times a day as needed.) Apply topically 3 times daily. , Disp: , Rfl:     quinapril (ACCUPRIL) 20 MG tablet, Take 20 mg by mouth nightly., Disp: , Rfl:     levothyroxine (SYNTHROID) 50 MCG tablet, Take 50 mcg by mouth Daily. , Disp: , Rfl:     bumetanide (BUMEX) 1 MG tablet, Take 1 mg by mouth daily. , Disp: , Rfl:     simvastatin (ZOCOR) 40 MG tablet, Take 40 mg by mouth nightly., Disp: , Rfl: ALLERGIES:   Allergies   Allergen Reactions    Bactrim [Sulfamethoxazole-Trimethoprim] Hives    Pcn [Penicillins] Hives    Prozac [Fluoxetine Hcl] Hives    Codeine Nausea And Vomiting       FAMILY HISTORY:       Problem Relation Age of Onset    Diabetes Mother     Heart Failure Mother     Stroke Father          SOCIAL HISTORY:   Social History     Socioeconomic History    Marital status:      Spouse name: Not on file    Number of children: Not on file    Years of education: Not on file    Highest education level: Not on file   Occupational History    Not on file   Tobacco Use    Smoking status: Never    Smokeless tobacco: Never   Substance and Sexual Activity    Alcohol use: Yes     Alcohol/week: 0.0 standard drinks    Drug use: No    Sexual activity: Not on file   Other Topics Concern    Not on file   Social History Narrative    Not on file     Social Determinants of Health     Financial Resource Strain: Not on file   Food Insecurity: Not on file   Transportation Needs: Not on file   Physical Activity: Not on file   Stress: Not on file   Social Connections: Not on file   Intimate Partner Violence: Not on file   Housing Stability: Not on file       REVIEW OF SYSTEMS: A 12-point review of systemswas obtained and pertinent positives and negatives were enumerated above in the history of present illness. All other reviewed systems / symptoms were negative. Review of Systems   Constitutional: Negative. HENT: Negative. Eyes:  Positive for visual disturbance (glasses). Respiratory: Negative. Cardiovascular: Negative. Gastrointestinal:  Positive for abdominal pain, constipation and diarrhea. Endocrine: Negative. Genitourinary: Negative. Musculoskeletal: Negative. Skin: Negative. Allergic/Immunologic: Negative. Neurological: Negative. Hematological:  Bruises/bleeds easily. Psychiatric/Behavioral: Negative.        PHYSICAL EXAMINATION: Vital signs reviewed per the nursing documentation.     /67   Pulse 76   Ht 5' 3\" (1.6 m)   Wt 238 lb (108 kg)   BMI 42.16 kg/m²   Body mass index is 42.16 kg/m².   Physical Exam  Constitutional:       Appearance: Normal appearance.   Eyes:      General: No scleral icterus.     Pupils: Pupils are equal, round, and reactive to light.   Cardiovascular:      Rate and Rhythm: Normal rate and regular rhythm.      Heart sounds: Normal heart sounds.   Pulmonary:      Effort: Pulmonary effort is normal.      Breath sounds: Normal breath sounds.   Abdominal:      General: Bowel sounds are normal. There is no distension.      Palpations: Abdomen is soft. There is no mass.      Tenderness: There is no abdominal tenderness. There is no guarding.   Skin:     General: Skin is warm and dry.      Coloration: Skin is not jaundiced.   Neurological:      Mental Status: She is alert and oriented to person, place, and time. Mental status is at baseline.         LABORATORY DATA: Reviewed  Lab Results   Component Value Date    WBC 6.7 10/31/2022    HGB 12.9 10/31/2022    HCT 39.0 10/31/2022    MCV 84 10/31/2022     10/31/2022     10/31/2022    K 4.1 10/31/2022    CL 98 10/31/2022    CO2 32 10/31/2022    BUN 18 10/31/2022    CREATININE 0.96 10/31/2022    LABALBU 3.9 10/31/2022    BILITOT 0.7 10/31/2022    ALKPHOS 130 10/31/2022    AST 16 10/31/2022    ALT 19 10/31/2022         Lab Results   Component Value Date    RBC 4.62 10/31/2022    HGB 12.9 10/31/2022    MCV 84 10/31/2022    MCH 28.0 10/31/2022    MCHC 33.2 10/31/2022    RDW 14.9 10/31/2022    MPV 8.2 10/31/2022    BASOPCT 1 10/31/2022    LYMPHSABS 1.5 10/31/2022    MONOSABS 0.6 10/31/2022    NEUTROABS 4.1 10/31/2022    EOSABS 0.3 10/31/2022    BASOSABS 0.1 10/31/2022         DIAGNOSTIC TESTING:     No results found.       IMPRESSION: Ms. Antonio is a 62 y.o. female with    Diagnosis Orders   1. Bowel habit changes  COLONOSCOPY W/ OR W/O BIOPSY    EGD      2. Irritable bowel syndrome with  both constipation and diarrhea  COLONOSCOPY W/ OR W/O BIOPSY    EGD        Questionable history [facility].C.  Was on Entvyio for short time.  Has been without medications for several months.  Inflammatory markers were WNL.  Will arrange diagnostic colonoscopy.  Will also arrange EGD to evaluate persistent epigastric discomfort.    The Endoscopic procedure was explained to the patient in detail  The prep and NPO were explained  All the Risks, Benefits, and Alternatives were explained  Risk of Bleeding, Perforation and Cardio Respiratory risks were explained  her questions were answered  The procedure has been scheduled with the  in the office  Patient was asked to give us a call for any questions  The patient has verbalized understanding and agreement to this plan.     The patient was counseled at length about the risks of yun Covid-19 during their perioperative period and any recovery window from their procedure.  The patient was made aware that yun Covid-19  may worsen their prognosis for recovering from their procedure  and lend to a higher morbidity and/or mortality risk.  All material risks, benefits, and reasonable alternatives including postponing the procedure were discussed. The patient does wish to proceed with the procedure at this time.    Thank you for allowing me to participate in the care of Ms. Alvarez. For any further questions please do not hesitate to contact me.    I have reviewed and agree with the ROS entered by the MA/TANYA.         ALBERT Webb    [facility] Gastroenterology  Office #: [phone]

## 2023-01-26 ENCOUNTER — TELEPHONE (OUTPATIENT)
Dept: GASTROENTEROLOGY | Age: 63
End: 2023-01-26

## 2023-01-30 NOTE — TELEPHONE ENCOUNTER
Writer contacted patient regarding r/s colon/egd procedure with [place]. Patient has been rescheduled for 3/3/23 @10:15am PAT has been scheduled for 2/24/23 @10:00am. Writer resent bowel prep to patients home revised for patient to review.

## 2023-02-24 ENCOUNTER — HOSPITAL ENCOUNTER (OUTPATIENT)
Dept: PREADMISSION TESTING | Age: 63
Discharge: HOME | End: 2023-02-28

## 2023-02-24 VITALS — HEIGHT: 63 IN | BODY MASS INDEX: 41.64 KG/M2 | WEIGHT: 235 LBS

## 2023-02-24 RX ORDER — MAGNESIUM OXIDE 400 MG/1
400 TABLET ORAL 2 TIMES DAILY
COMMUNITY

## 2023-02-24 RX ORDER — CLONIDINE HYDROCHLORIDE 0.1 MG/1
0.1 TABLET ORAL NIGHTLY
COMMUNITY

## 2023-02-24 NOTE — PROGRESS NOTES

## 2023-03-01 NOTE — PRE-PROCEDURE INSTRUCTIONS
No answer, left message ? Unable to leave message ? When were you told to arrive at hospital ?  0815    Do you have a  ?y    Are you on any blood thinners ? n               If yes when did you stop taking ? Do you have your prep Rx filled and instruction ?  y    Nothing to eat the day before , only clear liquids.y    Are you experiencing any covid symptoms ? n    Do you have any infections or rash we should be aware of ? Do you have the Hibiclens soap to use the night before and the morning of surgery ? Nothing to eat or drink after midnight, only a sip of water to take any medication instructed to take the night before.   Wear comfortable clothing, leave any valuables at home, remove any jewelry and body piercing . y

## 2023-03-02 ENCOUNTER — ANESTHESIA EVENT (OUTPATIENT)
Dept: ENDOSCOPY | Age: 63
End: 2023-03-02
Payer: COMMERCIAL

## 2023-03-03 ENCOUNTER — HOSPITAL ENCOUNTER (OUTPATIENT)
Age: 63
Setting detail: OUTPATIENT SURGERY
Discharge: HOME | End: 2023-03-03
Attending: INTERNAL MEDICINE | Admitting: INTERNAL MEDICINE
Payer: COMMERCIAL

## 2023-03-03 ENCOUNTER — ANESTHESIA (OUTPATIENT)
Dept: ENDOSCOPY | Age: 63
End: 2023-03-03
Payer: COMMERCIAL

## 2023-03-03 VITALS
SYSTOLIC BLOOD PRESSURE: 119 MMHG | TEMPERATURE: 97 F | BODY MASS INDEX: 41.64 KG/M2 | HEART RATE: 77 BPM | OXYGEN SATURATION: 96 % | HEIGHT: 63 IN | WEIGHT: 235 LBS | DIASTOLIC BLOOD PRESSURE: 62 MMHG | RESPIRATION RATE: 10 BRPM

## 2023-03-03 DIAGNOSIS — K58.9 IRRITABLE BOWEL SYNDROME, UNSPECIFIED TYPE: ICD-10-CM

## 2023-03-03 DIAGNOSIS — R19.4 CHANGE IN BOWEL HABITS: ICD-10-CM

## 2023-03-03 PROBLEM — E78.5 HYPERLIPIDEMIA: Status: ACTIVE | Noted: 2023-03-03

## 2023-03-03 PROBLEM — M19.049 LOCALIZED, PRIMARY OSTEOARTHRITIS OF HAND: Status: ACTIVE | Noted: 2023-03-03

## 2023-03-03 PROBLEM — K62.5 RECTAL HEMORRHAGE: Status: ACTIVE | Noted: 2023-03-03

## 2023-03-03 PROBLEM — M65.30 ACQUIRED TRIGGER FINGER: Status: ACTIVE | Noted: 2020-03-10

## 2023-03-03 PROBLEM — E66.01 MORBID OBESITY (HCC): Status: ACTIVE | Noted: 2023-03-03

## 2023-03-03 PROBLEM — N17.9 ACUTE RENAL FAILURE SYNDROME (HCC): Status: ACTIVE | Noted: 2023-03-03

## 2023-03-03 PROBLEM — F33.1 MODERATE RECURRENT MAJOR DEPRESSION (HCC): Status: ACTIVE | Noted: 2023-03-03

## 2023-03-03 PROBLEM — M54.2 CERVICALGIA: Status: ACTIVE | Noted: 2018-03-29

## 2023-03-03 PROBLEM — M54.9 BACKACHE: Status: ACTIVE | Noted: 2018-03-29

## 2023-03-03 PROBLEM — F32.A DEPRESSIVE DISORDER: Status: ACTIVE | Noted: 2023-03-03

## 2023-03-03 PROBLEM — G56.00 CARPAL TUNNEL SYNDROME: Status: ACTIVE | Noted: 2018-08-16

## 2023-03-03 PROBLEM — G47.30 SLEEP APNEA: Status: ACTIVE | Noted: 2018-03-29

## 2023-03-03 PROBLEM — M65.30 TRIGGER FINGER: Status: ACTIVE | Noted: 2018-08-16

## 2023-03-03 PROBLEM — G47.33 OBSTRUCTIVE SLEEP APNEA SYNDROME: Status: ACTIVE | Noted: 2023-03-03

## 2023-03-03 PROBLEM — F41.1 GENERALIZED ANXIETY DISORDER: Status: ACTIVE | Noted: 2023-03-03

## 2023-03-03 PROBLEM — M25.50 ARTHRALGIA: Status: ACTIVE | Noted: 2023-03-03

## 2023-03-03 PROBLEM — E11.9 DIABETES MELLITUS (HCC): Status: ACTIVE | Noted: 2023-03-03

## 2023-03-03 PROBLEM — M72.0 DUPUYTREN'S DISEASE: Status: ACTIVE | Noted: 2020-03-10

## 2023-03-03 PROBLEM — R60.0 LOCALIZED EDEMA: Status: ACTIVE | Noted: 2023-03-03

## 2023-03-03 PROBLEM — E55.9 VITAMIN D DEFICIENCY: Status: ACTIVE | Noted: 2023-03-03

## 2023-03-03 PROBLEM — G44.209 TENSION TYPE HEADACHE: Status: ACTIVE | Noted: 2018-03-29

## 2023-03-03 PROBLEM — K00.0: Status: ACTIVE | Noted: 2023-03-03

## 2023-03-03 PROBLEM — Z96.659 STATUS POST TOTAL KNEE REPLACEMENT: Status: ACTIVE | Noted: 2018-08-16

## 2023-03-03 PROBLEM — M18.11 ARTHRITIS OF CARPOMETACARPAL (CMC) JOINT OF RIGHT THUMB: Status: ACTIVE | Noted: 2022-09-14

## 2023-03-03 PROBLEM — K51.90 ULCERATIVE COLITIS (HCC): Status: ACTIVE | Noted: 2023-03-03

## 2023-03-03 PROBLEM — M18.9 OSTEOARTHRITIS OF CARPOMETACARPAL (CMC) JOINT OF THUMB: Status: ACTIVE | Noted: 2020-03-10

## 2023-03-03 PROBLEM — M75.110 PARTIAL THICKNESS ROTATOR CUFF TEAR: Status: ACTIVE | Noted: 2018-08-16

## 2023-03-03 PROBLEM — R40.0 DAYTIME SOMNOLENCE: Status: ACTIVE | Noted: 2023-03-03

## 2023-03-03 LAB — GLUCOSE BLD-MCNC: 115 MG/DL (ref 65–105)

## 2023-03-03 PROCEDURE — 2580000003 HC RX 258: Performed by: NURSE ANESTHETIST, CERTIFIED REGISTERED

## 2023-03-03 PROCEDURE — 2580000003 HC RX 258: Performed by: ANESTHESIOLOGY

## 2023-03-03 PROCEDURE — 2500000003 HC RX 250 WO HCPCS: Performed by: NURSE ANESTHETIST, CERTIFIED REGISTERED

## 2023-03-03 PROCEDURE — 6360000002 HC RX W HCPCS: Performed by: ANESTHESIOLOGY

## 2023-03-03 PROCEDURE — 6360000002 HC RX W HCPCS: Performed by: NURSE ANESTHETIST, CERTIFIED REGISTERED

## 2023-03-03 PROCEDURE — 82947 ASSAY GLUCOSE BLOOD QUANT: CPT

## 2023-03-03 PROCEDURE — 3609010300 HC COLONOSCOPY W/BIOPSY SINGLE/MULTIPLE: Performed by: INTERNAL MEDICINE

## 2023-03-03 PROCEDURE — 7100000011 HC PHASE II RECOVERY - ADDTL 15 MIN: Performed by: INTERNAL MEDICINE

## 2023-03-03 PROCEDURE — 3609012400 HC EGD TRANSORAL BIOPSY SINGLE/MULTIPLE: Performed by: INTERNAL MEDICINE

## 2023-03-03 PROCEDURE — 43239 EGD BIOPSY SINGLE/MULTIPLE: CPT | Performed by: INTERNAL MEDICINE

## 2023-03-03 PROCEDURE — 3700000001 HC ADD 15 MINUTES (ANESTHESIA): Performed by: INTERNAL MEDICINE

## 2023-03-03 PROCEDURE — 88305 TISSUE EXAM BY PATHOLOGIST: CPT

## 2023-03-03 PROCEDURE — 2500000003 HC RX 250 WO HCPCS: Performed by: ANESTHESIOLOGY

## 2023-03-03 PROCEDURE — 2709999900 HC NON-CHARGEABLE SUPPLY: Performed by: INTERNAL MEDICINE

## 2023-03-03 PROCEDURE — 3700000000 HC ANESTHESIA ATTENDED CARE: Performed by: INTERNAL MEDICINE

## 2023-03-03 PROCEDURE — 45380 COLONOSCOPY AND BIOPSY: CPT | Performed by: INTERNAL MEDICINE

## 2023-03-03 PROCEDURE — 7100000010 HC PHASE II RECOVERY - FIRST 15 MIN: Performed by: INTERNAL MEDICINE

## 2023-03-03 RX ORDER — SODIUM CHLORIDE 0.9 % (FLUSH) 0.9 %
5-40 SYRINGE (ML) INJECTION PRN
Status: CANCELLED | OUTPATIENT
Start: 2023-03-03

## 2023-03-03 RX ORDER — SODIUM CHLORIDE 9 MG/ML
INJECTION, SOLUTION INTRAVENOUS PRN
Status: DISCONTINUED | OUTPATIENT
Start: 2023-03-03 | End: 2023-03-03 | Stop reason: HOSPADM

## 2023-03-03 RX ORDER — DEXAMETHASONE SODIUM PHOSPHATE 4 MG/ML
INJECTION, SOLUTION INTRA-ARTICULAR; INTRALESIONAL; INTRAMUSCULAR; INTRAVENOUS; SOFT TISSUE PRN
Status: DISCONTINUED | OUTPATIENT
Start: 2023-03-03 | End: 2023-03-03 | Stop reason: SDUPTHER

## 2023-03-03 RX ORDER — ONDANSETRON 2 MG/ML
4 INJECTION INTRAMUSCULAR; INTRAVENOUS
Status: CANCELLED | OUTPATIENT
Start: 2023-03-03 | End: 2023-03-04

## 2023-03-03 RX ORDER — HYDRALAZINE HYDROCHLORIDE 20 MG/ML
10 INJECTION INTRAMUSCULAR; INTRAVENOUS
Status: CANCELLED | OUTPATIENT
Start: 2023-03-03

## 2023-03-03 RX ORDER — DIPHENHYDRAMINE HYDROCHLORIDE 50 MG/ML
12.5 INJECTION INTRAMUSCULAR; INTRAVENOUS
Status: CANCELLED | OUTPATIENT
Start: 2023-03-03 | End: 2023-03-04

## 2023-03-03 RX ORDER — SIMETHICONE 20 MG/.3ML
EMULSION ORAL PRN
Status: DISCONTINUED | OUTPATIENT
Start: 2023-03-03 | End: 2023-03-03 | Stop reason: ALTCHOICE

## 2023-03-03 RX ORDER — ONDANSETRON 2 MG/ML
INJECTION INTRAMUSCULAR; INTRAVENOUS PRN
Status: DISCONTINUED | OUTPATIENT
Start: 2023-03-03 | End: 2023-03-03 | Stop reason: SDUPTHER

## 2023-03-03 RX ORDER — SODIUM CHLORIDE 9 MG/ML
INJECTION, SOLUTION INTRAVENOUS PRN
Status: CANCELLED | OUTPATIENT
Start: 2023-03-03

## 2023-03-03 RX ORDER — FENTANYL CITRATE 0.05 MG/ML
25 INJECTION, SOLUTION INTRAMUSCULAR; INTRAVENOUS EVERY 5 MIN PRN
Status: CANCELLED | OUTPATIENT
Start: 2023-03-03

## 2023-03-03 RX ORDER — SODIUM CHLORIDE 0.9 % (FLUSH) 0.9 %
5-40 SYRINGE (ML) INJECTION PRN
Status: DISCONTINUED | OUTPATIENT
Start: 2023-03-03 | End: 2023-03-03 | Stop reason: HOSPADM

## 2023-03-03 RX ORDER — PROPOFOL 10 MG/ML
INJECTION, EMULSION INTRAVENOUS PRN
Status: DISCONTINUED | OUTPATIENT
Start: 2023-03-03 | End: 2023-03-03 | Stop reason: SDUPTHER

## 2023-03-03 RX ORDER — METOCLOPRAMIDE HYDROCHLORIDE 5 MG/ML
10 INJECTION INTRAMUSCULAR; INTRAVENOUS
Status: CANCELLED | OUTPATIENT
Start: 2023-03-03 | End: 2023-03-04

## 2023-03-03 RX ORDER — ONDANSETRON 2 MG/ML
4 INJECTION INTRAMUSCULAR; INTRAVENOUS ONCE
Status: COMPLETED | OUTPATIENT
Start: 2023-03-03 | End: 2023-03-03

## 2023-03-03 RX ORDER — LABETALOL HYDROCHLORIDE 5 MG/ML
10 INJECTION, SOLUTION INTRAVENOUS
Status: CANCELLED | OUTPATIENT
Start: 2023-03-03

## 2023-03-03 RX ORDER — SODIUM CHLORIDE, SODIUM LACTATE, POTASSIUM CHLORIDE, CALCIUM CHLORIDE 600; 310; 30; 20 MG/100ML; MG/100ML; MG/100ML; MG/100ML
INJECTION, SOLUTION INTRAVENOUS CONTINUOUS
Status: DISCONTINUED | OUTPATIENT
Start: 2023-03-03 | End: 2023-03-03 | Stop reason: HOSPADM

## 2023-03-03 RX ORDER — SODIUM CHLORIDE 0.9 % (FLUSH) 0.9 %
5-40 SYRINGE (ML) INJECTION EVERY 12 HOURS SCHEDULED
Status: CANCELLED | OUTPATIENT
Start: 2023-03-03

## 2023-03-03 RX ORDER — SODIUM CHLORIDE 0.9 % (FLUSH) 0.9 %
5-40 SYRINGE (ML) INJECTION EVERY 12 HOURS SCHEDULED
Status: DISCONTINUED | OUTPATIENT
Start: 2023-03-03 | End: 2023-03-03 | Stop reason: HOSPADM

## 2023-03-03 RX ORDER — LIDOCAINE HYDROCHLORIDE 10 MG/ML
1 INJECTION, SOLUTION EPIDURAL; INFILTRATION; INTRACAUDAL; PERINEURAL
Status: COMPLETED | OUTPATIENT
Start: 2023-03-03 | End: 2023-03-03

## 2023-03-03 RX ORDER — SODIUM CHLORIDE, SODIUM LACTATE, POTASSIUM CHLORIDE, CALCIUM CHLORIDE 600; 310; 30; 20 MG/100ML; MG/100ML; MG/100ML; MG/100ML
INJECTION, SOLUTION INTRAVENOUS CONTINUOUS PRN
Status: DISCONTINUED | OUTPATIENT
Start: 2023-03-03 | End: 2023-03-03 | Stop reason: SDUPTHER

## 2023-03-03 RX ORDER — ACETAMINOPHEN 325 MG/1
650 TABLET ORAL
Status: CANCELLED | OUTPATIENT
Start: 2023-03-03 | End: 2023-03-04

## 2023-03-03 RX ORDER — LIDOCAINE HYDROCHLORIDE 20 MG/ML
INJECTION, SOLUTION EPIDURAL; INFILTRATION; INTRACAUDAL; PERINEURAL PRN
Status: DISCONTINUED | OUTPATIENT
Start: 2023-03-03 | End: 2023-03-03 | Stop reason: SDUPTHER

## 2023-03-03 RX ORDER — MEPERIDINE HYDROCHLORIDE 25 MG/ML
12.5 INJECTION INTRAMUSCULAR; INTRAVENOUS; SUBCUTANEOUS EVERY 5 MIN PRN
Status: CANCELLED | OUTPATIENT
Start: 2023-03-03

## 2023-03-03 RX ADMIN — DEXAMETHASONE SODIUM PHOSPHATE 4 MG: 4 INJECTION, SOLUTION INTRAMUSCULAR; INTRAVENOUS at 11:07

## 2023-03-03 RX ADMIN — LIDOCAINE HYDROCHLORIDE 40 MG: 20 INJECTION, SOLUTION EPIDURAL; INFILTRATION; INTRACAUDAL; PERINEURAL at 11:15

## 2023-03-03 RX ADMIN — ONDANSETRON 4 MG: 2 INJECTION, SOLUTION INTRAMUSCULAR; INTRAVENOUS at 11:05

## 2023-03-03 RX ADMIN — PROPOFOL 330 MG: 10 INJECTION, EMULSION INTRAVENOUS at 11:42

## 2023-03-03 RX ADMIN — SODIUM CHLORIDE, POTASSIUM CHLORIDE, SODIUM LACTATE AND CALCIUM CHLORIDE: 600; 310; 30; 20 INJECTION, SOLUTION INTRAVENOUS at 09:13

## 2023-03-03 RX ADMIN — SODIUM CHLORIDE, POTASSIUM CHLORIDE, SODIUM LACTATE AND CALCIUM CHLORIDE: 600; 310; 30; 20 INJECTION, SOLUTION INTRAVENOUS at 10:56

## 2023-03-03 RX ADMIN — LIDOCAINE HYDROCHLORIDE 1 ML: 10 INJECTION, SOLUTION EPIDURAL; INFILTRATION; INTRACAUDAL; PERINEURAL at 09:13

## 2023-03-03 RX ADMIN — ONDANSETRON HYDROCHLORIDE 4 MG: 2 SOLUTION INTRAMUSCULAR; INTRAVENOUS at 09:36

## 2023-03-03 ASSESSMENT — ENCOUNTER SYMPTOMS
CONSTIPATION: 0
WHEEZING: 0
SHORTNESS OF BREATH: 0
ABDOMINAL PAIN: 1
SHORTNESS OF BREATH: 0
COUGH: 0
DIARRHEA: 1
BLOOD IN STOOL: 0
TROUBLE SWALLOWING: 1
SORE THROAT: 0
VOMITING: 0
NAUSEA: 1
ABDOMINAL DISTENTION: 1
RHINORRHEA: 0
APNEA: 1
STRIDOR: 0

## 2023-03-03 ASSESSMENT — PAIN DESCRIPTION - DESCRIPTORS: DESCRIPTORS: ACHING

## 2023-03-03 ASSESSMENT — PAIN SCALES - GENERAL: PAINLEVEL_OUTOF10: 0

## 2023-03-03 ASSESSMENT — PAIN - FUNCTIONAL ASSESSMENT: PAIN_FUNCTIONAL_ASSESSMENT: 0-10

## 2023-03-03 ASSESSMENT — LIFESTYLE VARIABLES: SMOKING_STATUS: 0

## 2023-03-03 NOTE — OP NOTE
COLONOSCOPY    DATE OF PROCEDURE: 3/3/2023    SURGEON: Jolly Ortega MD    ASSISTANT: None    PREOPERATIVE DIAGNOSIS: Patient has diarrhea. In the past patient was diagnosed to have inflammatory bowel disease and was treated with [facility] for a while and patient stopped taking medications almost 9 months ago. Procedure performed to evaluate disease activity. POSTOPERATIVE DIAGNOSIS: No signs of inflammatory bowel disease seen in the colon and also 3 to 4 inches of ileum. OPERATION: Total colonoscopy random biopsies from the colon    ANESTHESIA: MAC    ESTIMATED BLOOD LOSS: None    COMPLICATIONS: None     SPECIMENS:  Was Obtained: Random biopsies from the right, transverse, left colon to evaluate possibility of inflammatory bowel disease    HISTORY: The patient is a 60y.o. year old female with history of above preop diagnosis. I recommended colonoscopy with possible biopsy or polypectomy and I explained the risk, benefits, expected outcome, and alternatives to the procedure. Risks included but are not limited to bleeding, infection, respiratory distress, hypotension, and perforation of the colon and possibility of missing a lesion. The patient understands and is in agreement. PROCEDURE:  The patient's SPO2 remained above 90% throughout the procedure. Digital rectal exam was normal.  The colonoscope was inserted through the anus into the rectum and advanced under direct vision to the cecum without difficulty. Terminal ileum was examined for approximately 2 inches. The prep was good. Findings:  Terminal ileum: normal, examined for 3 to 4 inches    Cecum/Ascending colon: normal    Transverse colon: normal    Descending/Sigmoid colon: normal    Rectum/Anus: examined in normal and retroflexed positions and was normal    Withdrawal Time was (minutes): 10    During this procedure no signs of inflammatory bowel disease seen in the colon and ileum.   Mucosa throughout no scarring of the mucosa pseudopolyps seen. The colon was decompressed and the scope was removed. The patient tolerated the procedure without unusual events. During the procedure, the patient's blood pressure, pulse and oxygen saturation remained stable and documented. No unusual events occurred during the procedure. Patient was transferred to recovery room and will be discharged when criteria is met.      Recommendations/Plan:   F/U Biopsies  F/U In Office as instructed  Discussed with the family  High fiber diet   Precautions to avoid constipation     Electronically signed by Jaja Panchal MD  on 3/3/2023 at 11:45 AM
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
## 2023-03-03 NOTE — ANESTHESIA POSTPROCEDURE EVALUATION
POST- ANESTHESIA EVALUATION       Pt Name: Ray Garcia  MRN: [id number]  YOB: 1960  Date of evaluation: 3/3/2023  Time:  4:15 PM      /62   Pulse 77   Temp 97 °F (36.1 °C)   Resp 10   Ht 5' 3\" (1.6 m)   Wt 235 lb (106.6 kg)   SpO2 96%   BMI 41.63 kg/m²      Consciousness Level  Awake  Cardiopulmonary Status  Stable  Pain Adequately Treated YES  Nausea / Vomiting  NO  Adequate Hydration  YES  Anesthesia Related Complications NONE      Electronically signed by Perri Mcduffie MD on 3/3/2023 at 4:15 PM       Department of Anesthesiology  Postprocedure Note    Patient: Ray Garcia  MRN: [id number]  YOB: 1960  Date of evaluation: 3/3/2023      Procedure Summary     Date: 03/03/23 Room / Location: [place] [address] 02 / [facility]    Anesthesia Start: 3885 Anesthesia Stop: 1337    Procedures:       EGD BIOPSY (Esophagus)      COLONOSCOPY WITH BIOPSY (Rectum) Diagnosis:       Change in bowel habits      Irritable bowel syndrome, unspecified type      (CHANGE IN BOWEL HABITS, IRRITABLE BOWEL SYNDROME)    Surgeons: Laura Voss MD Responsible Provider: Perri Mcduffie MD    Anesthesia Type: General ASA Status: 3          Anesthesia Type: General    Sharla Phase I:      Sharla Phase II: Sharla Score: 10      Anesthesia Post Evaluation

## 2023-03-03 NOTE — DISCHARGE INSTRUCTIONS
To see in the office in the next 2 to 3 weeks            Sedation or General Anesthesia, Adult  Care After  Refer to this sheet in the next 24 hours. These instructions provide you with information on caring for yourself after your procedure. Your caregiver may also give you more specific instructions. Your treatment has been planned according to current medical practices, but problems sometimes occur. Call your caregiver if you have any problems or questions after your procedure. HOME CARE INSTRUCTIONS   Do not participate in any activities that require you to be alert or coordinated. Do not:  Drive. Swim. Ride a bicycle. Operate heavy machinery. Milena Basset. Use power tools. Climb ladders. Work at [employer]. Take a bath. Do not drink alcohol. Do not make any important decisions or sign legal documents. Stay with an adult. The first meal following your procedure should be light and small. Avoid solid foods if you feel sick to your stomach (nauseous) or if you throw up (vomit). Drink enough fluids to keep your urine clear or pale yellow. Only take your usual medicines or new medicines if your caregiver approves them. Only take over-the-counter or prescription medicines for pain, discomfort, or fever as directed by your caregiver. Keep all follow-up appointments as directed by your caregiver. SEEK IMMEDIATE MEDICAL CARE IF:   You are not feeling normal or behaving normally after 24 hours. You have persistent nausea and vomiting. You are unable to drink fluids or eat food. You have difficulty urinating. You have difficulty breathing or speaking. You have blue or gray skin. There is difficulty waking or you cannot be woken up. You have heavy bleeding, redness, or a lot of swelling where the sedative or anesthesia entered your skin (intravenous site). You have a rash. MAKE SURE YOU:  Understand these instructions. Will watch your condition.   Will get help right away if you are not doing well or get worse.  Document Released: 12/18/2006 Document Revised: 06/18/2013 Document Reviewed: 04/17/2013  ExitCare® Patient Information ©2013 [employer].               EGD DISCHARGE INSTRUCTIONS    Activity:  Rest today. No driving, operating machinery, or making any important decisions today. May resume normal activity tomorrow.    Diet:  Following EGD eat slowly, chew food well, cut food into small pieces-Avoid greasy, spicy, \"crunchy\" foods X 48 hours.     Call your Doctor if you have any of the following:  -Passing blood rectally or vomiting blood (it may be red or black).  -Persistent nausea/vomiting  -Severe abdominal or chest pain not relieved with passing gas  -Fever of 100 degrees or more  -Redness or swelling at the IV site  -Severe sore throat or neck pain               Colonoscopy: What to Expect at Home  Your Recovery  After you have a colonoscopy, you will stay at the clinic for 1 to 2 hours until the medicines wear off. Then you can go home, but you will need to arrange for a ride. Your doctor will tell you when you can eat and do your other usual activities.  Your doctor will talk to you about when you will need your next colonoscopy. The results of your test and your risk for colorectal cancer will help your doctor decide how often you need to be checked.  After the test, you may be bloated or have gas pains. You may need to pass gas. If a biopsy was done or a polyp was removed, you may have streaks of blood in your stool (feces) for a few days.  This care sheet gives you a general idea about how long it will take for you to recover. But each person recovers at a different pace. Follow the steps below to get better as quickly as possible.  How can you care for yourself at home?  Activity  Rest as much as you need to after you go home.  You should be able to go back to your usual activities the day after the test.  Diet  Follow your doctor’s directions for eating.  Drink plenty of fluids (unless your  doctor has told you not to) to replace the fluids that were lost during the colon prep. Do not drink alcohol. Medicines  If polyps were removed or a biopsy was done during the test, your doctor may tell you not to take aspirin or other anti-inflammatory medicines, such as ibuprofen (Advil, Motrin) and naproxen (Aleve), for a few days. Other instructions  For your safety, you should not drive or operate machinery until the medicine effects are gone and you can think clearly. Your doctor may tell you not to drive or operate machinery until the day after your test.  Do not sign legal documents or make major decisions until the medicine effects are gone and you can think clearly. The anesthesia medicine can make it hard for you to fully understand what you are agreeing to. Follow-up care is a key part of your treatment and safety. Be sure to make and go to all appointments, and call your doctor if you are having problems. It's also a good idea to know your test results and keep a list of the medicines you take. Call your Doctor if you have any of the following:             Passing blood rectally or vomiting blood (it may be red or black). Persistent nausea or vomiting. Severe abdominal or chest pain, not relieved by passing gas. Fever of 100 or more, chills or excessive sweating. Redness or swelling at the IV site. If you experience shortness of breath or severe chest pain, call 911. Where can you learn more? Go to https://V[employer]kimeb.foodpanda / hellofood. org and sign in to your [employer] account. Enter E264 in the [facility] box to learn more about Colonoscopy: What to Expect at Home.     If you do not have an account, please click on the Sign Up Now link. © 0009-6184 Healthwise, Incorporated. Care instructions adapted under license by [facility].  This care instruction is for use with your licensed healthcare professional. If you have questions about a medical condition or this instruction, always ask your healthcare professional. [facility] [address] any warranty or liability for your use of this information. Content Version: 6.0.569724; Last Revised: February 20, 2013            Colon Polypectomy   (Colon Polyp Removal)       Definition   A colon polypectomy is the removal of polyps from the inside lining of the colon (large intestine). A polyp is a mass of tissue. Some types of polyps have the potential to develop into cancer. Most polyps can be removed during a colonoscopy or sigmoidoscopy . A Colon Polyp        2011 [address] [place] [id number].   Reasons for Procedure   The purpose of the surgery is to remove a polyp. It is done for cancer prevention. In rare cases, larger polyps can cause troublesome symptoms, such as rectal bleeding, abdominal pain, and bowel irregularities. A polyp removal will relieve these symptoms. Possible Complications   Complications are rare, but no procedure is completely free of risk.  If you are planning to have a polypectomy, your doctor will review a list of possible complications, which may include:   Damage to the colon wall   Bleeding   Infection   Adverse reaction to the sedative   Factors that may increase the risk of complications include:   Type, size, and location of the polyp   Patient factors, such as blood-clotting disorders, substance abuse, or other diseases (eg, obesity , diabetes )   What to Expect   Prior to Procedure    Your doctor will likely do the following:   Physical exam and health history   Review of medicines   Test your stool for hidden blood (called \"occult blood\")   X-rays an exam that uses small amounts of radiation to make a picture of the inside of the body   Barium enema x-ray exam that uses contrast to help better see the colon   Diagnostic colonoscopy or sigmoidoscopyexamination of the inside of the intestine with an endoscope   Your colon must be completely cleaned before the procedure. Any stool left in the intestine will block the view. This preparation may start several days before the procedure. Follow your doctor's instructions, which may include any of the following cleansing methods:   Enemas fluid introduced into the rectum to stimulate a bowel movement   Laxativesmedicines that cause you to have soft bowel movements   A clear-liquid diet   Oral cathartic medicinesa large container of fluid to drink, which stimulates a bowel movement   Leading up to your procedure:   Talk to your doctor about your medicines. You may be asked to stop taking some medicines up to one week before the procedure, like:   Anti-inflammatory drugs (eg, aspirin)   Blood thinners, like clopidogrel (Plavix) or warfarin (Coumadin)   Iron supplements or vitamins containing iron. The night before, eat a light meal. Do not eat or drink anything after midnight. Wear comfortable clothing. If you have diabetes, ask your doctor if you need to adjust your insulin dose. Arrange for a ride home after the procedure. Anesthesia    You will receive a sedative. This will help you relax. You will be drowsy but awake. Description of the Procedure    You will be asked to lie on your side or on your back. A scope, a long flexible tube with a camera on the end, will be inserted through the anus. It will be slowly pushed through the rectum to the colon. The scope will also add air to open the colon. Using the scope, the doctor will locate the polyp. The polyp will be snipped off with a wire snare from the scope. In some cases, the polyp may be destroyed with an electric current. The electric current is also used to close the wound and stop bleeding. The polyps will then be removed for lab testing. When the doctor is finished, the scope will be slowly removed. For larger polyps, a laparoscopic surgical procedure may be needed.  Special surgical tools will be inserted through small incisions in the abdomen. The tools will be used to locate and remove the polyp. How Long Will It Take? 30-60 minutes   Will It Hurt? The special cleaning solution, laxatives, and/or enemas often cause discomfort. During and following the procedure, there is little or no pain. You may feel pressure, bloating, and/or cramping because of the air passed into the colon. This discomfort will go away with the passing of gas. Your doctor may prescribe pain medicine. If not, you can take non-prescription pain relievers for discomfort. Post-procedure Care   At the [facility]    The polyps will be sent to a lab for testing. At Home    Expect a complete recovery within two weeks. To ensure a smooth recovery, be sure to follow your doctor's instructions , which may include: The sedative will make you drowsy. Do not drive, operate machinery, or make important decisions the day of the procedure. Return to your normal diet the same or next day. Avoid tea, coffee, cola drinks, alcohol, and spicy foods for at least 2-3 days following surgery. These can irritate the digestive system. To speed healing, resume normal activities as soon as you feel able. Most people feel well enough by the next day. Ask your doctor when you can participate in any rigorous exercise. Ask your doctor about when it is safe to shower, bathe, or soak in water. You will be scheduled for a follow-up colonoscopy in the future. It will be important to check for recurrence of polyps. Your doctor will discuss the results with you either the day of surgery or the following day.    Call Your Doctor   After arriving home, contact your doctor if any of the following occurs:   Signs of infection, including fever and chills   Redness, swelling, increasing pain, excessive bleeding, or discharge from the rectum (Up to cup of blood per day can be expected for up to 3-4 days following your polypectomy.)   Black, tarry stools   Severe abdominal pain   Hard, swollen abdomen   Inability to pass gas or stool   Cough , shortness of breath, chest pain, or severe nausea or vomiting   New, unexplained symptoms   In case of emergency,  CALL 911  .      Last Reviewed: December 2010 Crystal Capellan MD   Updated: 4/7/2011

## 2023-03-03 NOTE — OP NOTE
ESOPHAGOGASTRODUODENOSCOPY   ( EGD )  DATE OF PROCEDURE: 3/3/2023     SURGEON: Maira Larkin MD    ASSISTANT: None    PREOPERATIVE DIAGNOSIS: Patient has epigastric discomfort dyspepsia not getting better with symptomatic treatment procedure performed to evaluate upper GI lesions    POSTOPERATIVE DIAGNOSIS: Small benign looking gastric polyps. No other lesions seen that can account for patient's symptoms    OPERATION: Upper GI endoscopy with Biopsy    ANESTHESIA: MAC    ESTIMATED BLOOD LOSS: None    COMPLICATIONS: None. SPECIMENS:  Was Obtained: Gastric polyps for histology    HISTORY: The patient is a 60y.o. year old female with history of above preop diagnosis. I recommended esophagogastroduodenoscopy with possible biopsy and I explained the risk, benefits, expected outcome, and alternatives to the procedure. Risks included but are not limited to bleeding, infection, respiratory distress, hypotension, and perforation of the esophagus, stomach, or duodenum. Patient understands and is in agreement. PROCEDURE: The patient was given IV conscious sedation. The patient's SPO2 remained above 90% throughout the procedure. Cetacaine spray given. Patient placed in left lateral position. Olympus  videogastroscope was inserted orally under vision into the esophagus without difficulty and advanced into the stomach then through the pylorus up to the second part of duodenum. Findings:    Retropharyngeal area was grossly normal appearing    Esophagus: normal, small sliding hiatal hernia clinically nonsignificant    Stomach:    Fundus and Cardia Examined in Retroflexed View: normal    Body: normal    Antrum: normal  Multiple 2 to 3 mm, benign looking gastric polyps seen biopsied for histology  Duodenum:     Descending: normal    Bulb: normal    While withdrawing the scope the above findings were verified and the scope was removed. The patient has tolerated the procedure without unusual events. Recommendations/Plan:   F/U Biopsies  F/U In Office as instructed  Discussed with the family                   Electronically signed by Rachelle Looney MD  on 3/3/2023 at 11:49 AM

## 2023-03-06 LAB — SURGICAL PATHOLOGY REPORT: NORMAL

## 2023-03-14 ENCOUNTER — TELEPHONE (OUTPATIENT)
Dept: GASTROENTEROLOGY | Age: 63
End: 2023-03-14

## 2023-03-14 NOTE — TELEPHONE ENCOUNTER
Pt called in asking for colon/EGD report to be faxed over to her PCP office because she has an appointment with them this afternoon, writer called pt PCP office got fax #[phone] and faxed over colon/EGD report due to patient preferance, pt thanked writer call ended.

## 2023-03-29 ENCOUNTER — OFFICE VISIT (OUTPATIENT)
Dept: GASTROENTEROLOGY | Age: 63
End: 2023-03-29
Payer: COMMERCIAL

## 2023-03-29 VITALS
BODY MASS INDEX: 41.82 KG/M2 | DIASTOLIC BLOOD PRESSURE: 74 MMHG | WEIGHT: 236 LBS | SYSTOLIC BLOOD PRESSURE: 118 MMHG | HEIGHT: 63 IN

## 2023-03-29 DIAGNOSIS — R10.12 ABDOMINAL PAIN, LEFT UPPER QUADRANT: ICD-10-CM

## 2023-03-29 DIAGNOSIS — R10.9 ABDOMINAL PAIN, UNSPECIFIED ABDOMINAL LOCATION: Primary | ICD-10-CM

## 2023-03-29 PROCEDURE — 99214 OFFICE O/P EST MOD 30 MIN: CPT | Performed by: NURSE PRACTITIONER

## 2023-03-29 PROCEDURE — 3074F SYST BP LT 130 MM HG: CPT | Performed by: NURSE PRACTITIONER

## 2023-03-29 PROCEDURE — 3078F DIAST BP <80 MM HG: CPT | Performed by: NURSE PRACTITIONER

## 2023-03-29 RX ORDER — TIRZEPATIDE 2.5 MG/.5ML
INJECTION, SOLUTION SUBCUTANEOUS
COMMUNITY
Start: 2023-03-14

## 2023-03-29 RX ORDER — CALCIUM POLYCARBOPHIL 625 MG
2 TABLET ORAL DAILY
Qty: 60 TABLET | Refills: 0 | Status: SHIPPED | OUTPATIENT
Start: 2023-03-29

## 2023-03-29 ASSESSMENT — ENCOUNTER SYMPTOMS
CONSTIPATION: 1
RESPIRATORY NEGATIVE: 1
ALLERGIC/IMMUNOLOGIC NEGATIVE: 1
ABDOMINAL PAIN: 1

## 2023-03-29 NOTE — PROGRESS NOTES
significant pathology seen during EGD or colonoscopy. Diarrhea has resolved. Has intermittent constipation. Advise high-fiber diet, increase fluids. Stool softeners as needed. Patient has some epigastric, left upper quadrant pain extending to the back. No recent weight loss. Pain not worse when lying down. Has some tenderness to palpation in the right upper quadrant as well. Will check CT abdomen and pelvis and return to the office in the next few weeks    I have reviewed and agree with the ROS entered by the MA/TANYA.      TRUDY ColemanC    [facility]  Office #: [phone]

## 2023-04-06 ENCOUNTER — TELEPHONE (OUTPATIENT)
Dept: GASTROENTEROLOGY | Age: 63
End: 2023-04-06

## 2023-04-06 DIAGNOSIS — R10.9 ABDOMINAL PAIN, UNSPECIFIED ABDOMINAL LOCATION: Primary | ICD-10-CM

## 2023-04-06 NOTE — TELEPHONE ENCOUNTER
Pt called regarding CT order with IV contrast, pt states she will either need clearance from nephrologist to have CT with contrast or order will have to be switched to w/o contrast, wanting to ask NP Chino Mondragon what she would like to do, adv pt will send her message and get back with her.

## 2023-04-07 NOTE — TELEPHONE ENCOUNTER
Order placed for CT abd WO contrast  Please advise.     Electronically signed by YOVANI Medellin NP on 4/7/2023 at 12:03 PM

## 2023-04-18 ENCOUNTER — HOSPITAL ENCOUNTER (OUTPATIENT)
Dept: CT IMAGING | Age: 63
Discharge: HOME | End: 2023-04-20
Payer: COMMERCIAL

## 2023-04-18 DIAGNOSIS — R10.9 ABDOMINAL PAIN, UNSPECIFIED ABDOMINAL LOCATION: ICD-10-CM

## 2023-04-18 PROCEDURE — 74176 CT ABD & PELVIS W/O CONTRAST: CPT

## 2023-05-10 ENCOUNTER — OFFICE VISIT (OUTPATIENT)
Dept: GASTROENTEROLOGY | Age: 63
End: 2023-05-10
Payer: COMMERCIAL

## 2023-05-10 VITALS
BODY MASS INDEX: 40.75 KG/M2 | DIASTOLIC BLOOD PRESSURE: 78 MMHG | WEIGHT: 230 LBS | SYSTOLIC BLOOD PRESSURE: 125 MMHG | HEIGHT: 63 IN

## 2023-05-10 DIAGNOSIS — K58.2 IRRITABLE BOWEL SYNDROME WITH BOTH CONSTIPATION AND DIARRHEA: ICD-10-CM

## 2023-05-10 DIAGNOSIS — R10.9 ABDOMINAL PAIN, UNSPECIFIED ABDOMINAL LOCATION: Primary | ICD-10-CM

## 2023-05-10 PROCEDURE — APPSS45 APP SPLIT SHARED TIME 31-45 MINUTES: Performed by: NURSE PRACTITIONER

## 2023-05-10 PROCEDURE — 99214 OFFICE O/P EST MOD 30 MIN: CPT | Performed by: INTERNAL MEDICINE

## 2023-05-10 PROCEDURE — 3078F DIAST BP <80 MM HG: CPT | Performed by: INTERNAL MEDICINE

## 2023-05-10 PROCEDURE — 3074F SYST BP LT 130 MM HG: CPT | Performed by: INTERNAL MEDICINE

## 2023-05-10 RX ORDER — FERROUS SULFATE 137(45) MG
142 TABLET, EXTENDED RELEASE ORAL DAILY
Qty: 30 TABLET | Refills: 3 | Status: SHIPPED | OUTPATIENT
Start: 2023-05-10

## 2023-05-10 RX ORDER — POLYETHYLENE GLYCOL 3350 17 G/17G
17 POWDER, FOR SOLUTION ORAL DAILY
Qty: 510 G | Refills: 0 | Status: SHIPPED | OUTPATIENT
Start: 2023-05-10 | End: 2023-06-09

## 2023-05-10 RX ORDER — TETRACYCLINE HYDROCHLORIDE 250 MG/1
250 CAPSULE ORAL 4 TIMES DAILY
Qty: 10 CAPSULE | Refills: 0 | Status: SHIPPED | OUTPATIENT
Start: 2023-05-10

## 2023-05-10 ASSESSMENT — ENCOUNTER SYMPTOMS
ALLERGIC/IMMUNOLOGIC NEGATIVE: 1
RESPIRATORY NEGATIVE: 1
ABDOMINAL PAIN: 1
CONSTIPATION: 1

## 2023-05-10 NOTE — PROGRESS NOTES
intravenous contrast. Multiplanar reformatted images are provided for review. Automated exposure control, iterative reconstruction, and/or weight based adjustment of the mA/kV was utilized to reduce the radiation dose to as low as reasonably achievable. COMPARISON: None. HISTORY: ORDERING SYSTEM PROVIDED HISTORY: Abdominal pain, unspecified abdominal location TECHNOLOGIST PROVIDED HISTORY: Reason for Exam: pt c/o mid-upper abdominal pain x 1 year FINDINGS: Liver: Normal Gallbladder: Surgically absent. Biliary System: Non-dilated. Pancreas: Normal. Spleen: Normal. Adrenals: Normal. Kidneys: Normal symmetric enhancement. No nephroliths. Ureters: Normal in course and caliber. Bladder: Normal. Pelvis: Normal. Stomach: Normal. Duodenum: Normal. Small Bowel: Normal. Colon: Normal. Appendix: The appendix is not definitively visualized, however no inflammatory changes adjacent to the cecum. Lymph Nodes: No lymphadenopathy. Peritoneum: No ascites or free air. No fluid collection. Retroperitoneum: Normal. Vessels: Normal caliber vessels. Abdominal Wall: Normal. Bones: No acute osseous findings. Lumbar spondylosis. Lung Bases: Normal. Inferior Mediastinum: Normal.     No acute findings. US HEAD NECK SOFT TISSUE THYROID    Result Date: 4/14/2023  EXAMINATION: THYROID ULTRASOUND 4/13/2023 7:30 pm COMPARISON: Cervical spine CT 02/14/2015 HISTORY: ORDERING SYSTEM PROVIDED HISTORY: Enlargement of thyroid FINDINGS: Right thyroid lobe:  Surgically absent Left thyroid lobe:  4.6 cm x 1.4 cm x 1.3 cm Isthmus:  0.7 cm THYROID GLAND:  Surgically absent right lobe with no significant remnant thyroid tissue in the visualized operative bed. Size of the remnant left lobe within normal limits and mild thickening of the remnant isthmus. Normal echogenicity and echotexture. Decreased vascularity. NODULES:  As below.  - Nodule 1:  0.7 cm x 0.8 cm x 0.4 cm, lower left lobe, almost completely solid (2), isoechoic (1), wider-than-tall (0),

## 2023-05-15 ENCOUNTER — TELEPHONE (OUTPATIENT)
Dept: GASTROENTEROLOGY | Age: 63
End: 2023-05-15

## 2023-05-15 NOTE — TELEPHONE ENCOUNTER
Patient called as she was put on Tetracycline by Chan Jackson and was given only 10 pills but was tot take 4 times a day for 10 days. Writer called pharmacy and called in for the remainder of the 8 days  with the correct dosing.

## 2023-08-11 ENCOUNTER — APPOINTMENT (OUTPATIENT)
Dept: ULTRASOUND IMAGING | Age: 63
End: 2023-08-11
Payer: COMMERCIAL

## 2023-08-11 ENCOUNTER — HOSPITAL ENCOUNTER (EMERGENCY)
Age: 63
Discharge: HOME | End: 2023-08-11
Attending: EMERGENCY MEDICINE
Payer: COMMERCIAL

## 2023-08-11 VITALS
SYSTOLIC BLOOD PRESSURE: 106 MMHG | TEMPERATURE: 97.9 F | WEIGHT: 219 LBS | OXYGEN SATURATION: 99 % | DIASTOLIC BLOOD PRESSURE: 63 MMHG | HEART RATE: 75 BPM | RESPIRATION RATE: 18 BRPM | BODY MASS INDEX: 38.8 KG/M2 | HEIGHT: 63 IN

## 2023-08-11 DIAGNOSIS — M79.662 PAIN OF LEFT CALF: Primary | ICD-10-CM

## 2023-08-11 PROCEDURE — 99284 EMERGENCY DEPT VISIT MOD MDM: CPT | Performed by: EMERGENCY MEDICINE

## 2023-08-11 PROCEDURE — 93971 EXTREMITY STUDY: CPT

## 2023-08-11 ASSESSMENT — PAIN DESCRIPTION - FREQUENCY: FREQUENCY: CONTINUOUS

## 2023-08-11 ASSESSMENT — PAIN - FUNCTIONAL ASSESSMENT
PAIN_FUNCTIONAL_ASSESSMENT: 0-10
PAIN_FUNCTIONAL_ASSESSMENT: PREVENTS OR INTERFERES SOME ACTIVE ACTIVITIES AND ADLS

## 2023-08-11 ASSESSMENT — PAIN DESCRIPTION - PAIN TYPE: TYPE: ACUTE PAIN

## 2023-08-11 ASSESSMENT — PAIN DESCRIPTION - ORIENTATION: ORIENTATION: LEFT;LOWER

## 2023-08-11 ASSESSMENT — PAIN DESCRIPTION - LOCATION: LOCATION: LEG

## 2023-08-11 ASSESSMENT — PAIN DESCRIPTION - DESCRIPTORS: DESCRIPTORS: ACHING;DISCOMFORT

## 2023-08-11 ASSESSMENT — PAIN SCALES - GENERAL: PAINLEVEL_OUTOF10: 6

## 2023-08-11 NOTE — DISCHARGE INSTRUCTIONS
Follow-up with your family doctor in the next 1 to 2 days for reevaluation    Ice or heat to affected area 4 times a day 20-minute intervals     Take Tylenol or Motrin as directed as needed for pain    If symptoms worsen or any new or concerning symptoms arise please return to the emergency department immediately

## 2023-08-14 NOTE — ANESTHESIA PRE PROCEDURE
Department of Anesthesiology  Preprocedure Note       Name:  Winifred Dick   Age:  60 y.o.  :  1960                                          MRN:  [id number]         Date:  3/3/2023      Surgeon: Peggy Heredia):  Vicki Manrique MD    Procedure: Procedure(s):  EGD BIOPSY  COLONOSCOPY DIAGNOSTIC    Medications prior to admission:   Prior to Admission medications    Medication Sig Start Date End Date Taking? Authorizing Provider   magnesium oxide (MAG-OX) 400 MG tablet Take 400 mg by mouth 2 times daily    Historical Provider, MD   cloNIDine (CATAPRES) 0.1 MG tablet Take 0.1 mg by mouth at bedtime    Historical Provider, MD   metFORMIN (GLUCOPHAGE) 500 MG tablet metformin 500 mg tablet   TAKE 1 TABLET BY MOUTH TWICE DAILY 18   Historical Provider, MD   DULoxetine (CYMBALTA) 30 MG extended release capsule Take 90 mg by mouth daily 10/21/22   Historical Provider, MD   ascorbic acid (VITAMIN C) 500 MG tablet Vitamin C 500 mg tablet   TAKE 1/2 (ONE-HALF) TABLET BY MOUTH ONCE DAILY IN THE MORNING WITH IRON MEDICATION 10/21/22   Historical Provider, MD   atorvastatin (LIPITOR) 40 MG tablet atorvastatin 40 mg tablet   TAKE 1 TABLET BY MOUTH NIGHTLY 22   Historical Provider, MD   busPIRone (BUSPAR) 10 MG tablet Take 10 mg by mouth in the morning and at bedtime 21   Historical Provider, MD   furosemide (LASIX) 40 MG tablet Take 40 mg by mouth daily 10/21/22   Historical Provider, MD   gabapentin (NEURONTIN) 300 MG capsule Take 300 mg by mouth.  TAKES AT HS. 21   Historical Provider, MD   hydroCHLOROthiazide (HYDRODIURIL) 25 MG tablet hydrochlorothiazide 25 mg tablet   TAKE 1 TABLET BY MOUTH ONCE DAILY 18   Historical Provider, MD   lisinopril (PRINIVIL;ZESTRIL) 30 MG tablet TAKES AT HS. 5/3/18   Historical Provider, MD   potassium chloride (KLOR-CON M) 20 MEQ extended release tablet potassium chloride ER 20 mEq tablet,extended release   TAKE 1 TABLET BY MOUTH ONCE DAILY    Historical Provider, MD It appears that the only way the insurance will cover this is if she has type 2 diabetes which she does not.  So, the patient could look at doing something outside of her insurance such as good Rx or similar.  It does not appear that her insurance will cover any of this type of medication unless it is specifically for type 2 diabetes.   ferrous sulfate (IRON 325) 325 (65 Fe) MG tablet FeroSul 325 mg (65 mg iron) tablet   TAKE 1 TABLET BY MOUTH IN THE MORNING WITH VITAMIN C TABLETS 6/29/22   Historical Provider, MD   omeprazole (PRILOSEC) 20 MG delayed release capsule Take 40 mg by mouth daily    Historical Provider, MD   gentamicin (GARAMYCIN) 0.1 % cream Apply 1 Dose topically 3 times daily as needed (Apply small amount to affected area 3 times a day as needed.) Apply topically 3 times daily. Historical Provider, MD   levothyroxine (SYNTHROID) 50 MCG tablet Take 50 mcg by mouth Daily. Historical Provider, MD   simvastatin (ZOCOR) 40 MG tablet Take 40 mg by mouth nightly. Historical Provider, MD       Current medications:    Current Facility-Administered Medications   Medication Dose Route Frequency Provider Last Rate Last Admin    sodium chloride flush 0.9 % injection 5-40 mL  5-40 mL IntraVENous 2 times per day Arelis Gar MD        sodium chloride flush 0.9 % injection 5-40 mL  5-40 mL IntraVENous PRN Larry Bolden MD        0.9 % sodium chloride infusion   IntraVENous PRN Arelis Gra MD        lactated ringers IV soln infusion   IntraVENous Continuous Larry Rosas MD        lidocaine PF 1 % injection 1 mL  1 mL IntraDERmal Once PRN Arelis Gar MD           Allergies: Allergies   Allergen Reactions    Bactrim [Sulfamethoxazole-Trimethoprim] Hives    Pcn [Penicillins] Hives    Prozac [Fluoxetine Hcl] Hives    Adhesive Tape Rash     RED, SORE, SKIN TEARS,RASH.     Codeine Nausea And Vomiting       Problem List:    Patient Active Problem List   Diagnosis Code    Chest pain R07.9    Hypothyroidism E03.9    HTN (hypertension) I10    GERD (gastroesophageal reflux disease) K21.9    Status post total knee replacement Z96.659    Trigger finger M65.30    Absence of teeth K00.0    Acquired trigger finger M65.30    Carpal tunnel syndrome G56.00    Acute renal failure syndrome (HCC) N17.9    Adult body mass index 40 and over [id number]    Arthralgia M25.50    Cervicalgia M54.2    Daytime somnolence R40.0    Depressive disorder F32. A    Diabetes mellitus ([facility] [address] E11.9    Dupuytren's disease M72.0    Generalized anxiety disorder F41.1    Hyperlipidemia E78.5    Localized edema R60.0    Arthritis of carpometacarpal (CMC) joint of right thumb M18.11    Localized, primary osteoarthritis of hand M19.049    Osteoarthritis of carpometacarpal (CMC) joint of thumb M18.9    Moderate recurrent major depression (HCC) F33.1    Morbid obesity (HCC) E66.01    Partial thickness rotator cuff tear M75.110    Rectal hemorrhage K62.5    Obstructive sleep apnea syndrome G47.33    Sleep apnea G47.30    Tension type headache G44.209    Ulcerative colitis ([facility]) K51.90    Vitamin D deficiency E55.9    Backache M54.9       Past Medical History:        Diagnosis Date    Abdominal pain     UPPER LEFT QUADRANT.  Arthritis     Chest pain 8/30/2017    Chronic kidney disease     Depression     Diarrhea     Difficult intubation     Gastric reflux     Headache(784.0)     Hypertension     N&V (nausea and vomiting)     PONV (postoperative nausea and vomiting)     Prolonged emergence from general anesthesia     Sleep apnea     USES C-PAP? OR BI-PAP    Thyroid disease     Ulcerative colitis ([facility] [address] 3/3/2023       Past Surgical History:        Procedure Laterality Date    CHOLECYSTECTOMY      COLONOSCOPY      ENDOSCOPY, COLON, DIAGNOSTIC  2021    EGD    FOOT NEUROMA SURGERY Right     HAND SURGERY Bilateral     RT. & LT THUMB DUE TO ARTHRITIS.    HYSTERECTOMY (CERVIX STATUS UNKNOWN)      JOINT REPLACEMENT      RECTAL SURGERY      FOR ANAL  FISSURE    TARSAL METATARSAL ARTHRODESIS Right     THYROIDECTOMY      TONSILLECTOMY      AS CHILD    TOTAL KNEE ARTHROPLASTY Bilateral        Social History:    Social History     Tobacco Use    Smoking status: Never    Smokeless tobacco: Never   Substance Use Topics    Alcohol use:  Yes Comment: SOCIALLY                                Counseling given: Not Answered      Vital Signs (Current): There were no vitals filed for this visit. BP Readings from Last 3 Encounters:   01/18/23 110/67   10/25/22 114/72   08/31/17 (!) 139/57       NPO Status:                                                                                 BMI:   Wt Readings from Last 3 Encounters:   02/24/23 235 lb (106.6 kg)   01/18/23 238 lb (108 kg)   10/25/22 237 lb (107.5 kg)     There is no height or weight on file to calculate BMI.    CBC:   Lab Results   Component Value Date/Time    WBC 6.7 10/31/2022 12:00 AM    RBC 4.62 10/31/2022 12:00 AM    HGB 12.9 10/31/2022 12:00 AM    HCT 39.0 10/31/2022 12:00 AM    MCV 84 10/31/2022 12:00 AM    RDW 14.9 10/31/2022 12:00 AM     10/31/2022 12:00 AM     LR    CMP:   Lab Results   Component Value Date/Time     10/31/2022 12:00 AM    K 4.1 10/31/2022 12:00 AM    CL 98 10/31/2022 12:00 AM    CO2 32 10/31/2022 12:00 AM    BUN 18 10/31/2022 12:00 AM    CREATININE 0.96 10/31/2022 12:00 AM    GFRAA >60 08/30/2017 11:33 AM    LABGLOM 59 10/31/2022 12:00 AM    LABGLOM >60 08/30/2017 11:33 AM    GLUCOSE 107 10/31/2022 12:00 AM    CALCIUM 9.3 10/31/2022 12:00 AM    BILITOT 0.7 10/31/2022 12:00 AM    ALKPHOS 130 10/31/2022 12:00 AM    AST 16 10/31/2022 12:00 AM    ALT 19 10/31/2022 12:00 AM       POC Tests: No results for input(s): POCGLU, POCNA, POCK, POCCL, POCBUN, POCHEMO, POCHCT in the last 72 hours.     Coags: No results found for: PROTIME, INR, APTT    HCG (If Applicable): No results found for: PREGTESTUR, PREGSERUM, HCG, HCGQUANT     ABGs: No results found for: PHART, PO2ART, FOI6MBJ, RMI8QGL, BEART, P8LYIENY     Type & Screen (If Applicable):  No results found for: LABABO, LABRH    Drug/Infectious Status (If Applicable):  No results found for: HIV, HEPCAB    COVID-19 Screening (If Applicable): No results found for: COVID19        Anesthesia Evaluation  Patient summary reviewed and Nursing notes reviewed   history of anesthetic complications: PONV and history of difficult intubation. Airway: Mallampati: IV  TM distance: >3 FB   Neck ROM: full  Mouth opening: > = 3 FB   Dental: normal exam         Pulmonary:normal exam  breath sounds clear to auscultation  (+) sleep apnea:      (-) pneumonia, COPD, asthma, shortness of breath, recent URI, rhonchi, wheezes, rales, stridor, not a current smoker and no decreased breath sounds          Patient did not smoke on day of surgery. Cardiovascular:  Exercise tolerance: good (>4 METS),   (+) hypertension: no interval change,     (-) pacemaker, valvular problems/murmurs, past MI, CAD, CABG/stent, dysrhythmias,  angina,  CHF, orthopnea, PND,  KIRKPATRICK, murmur, weak pulses,  friction rub, systolic click, carotid bruit,  JVD, peripheral edema, no pulmonary hypertension and no hyperlipidemia    ECG reviewed  Rhythm: regular  Rate: normal    Stress test reviewed       Beta Blocker:  Not on Beta Blocker         Neuro/Psych:   (+) neuromuscular disease:, headaches:, psychiatric history:   (-) seizures, TIA, CVA and depression/anxiety            GI/Hepatic/Renal:   (+) GERD: no interval change, PUD,      (-) hiatal hernia, hepatitis, liver disease, no renal disease, bowel prep and no morbid obesity       Endo/Other:    (+) DiabetesType II DM, no interval change, , hypothyroidism::., no malignancy/cancer. (-) hyperthyroidism, blood dyscrasia, arthritis, no electrolyte abnormalities, no malignancy/cancer               Abdominal:             Vascular:     - PVD, DVT and PE. Other Findings:           Anesthesia Plan      general     ASA 3       Induction: intravenous. MIPS: Postoperative opioids intended and Prophylactic antiemetics administered. Anesthetic plan and risks discussed with patient. Plan discussed with CRNA.                     Deepti Wells MD 3/3/2023

## 2023-09-30 ENCOUNTER — HOSPITAL ENCOUNTER (OUTPATIENT)
Age: 63
End: 2023-09-30
Payer: COMMERCIAL

## 2023-09-30 ENCOUNTER — HOSPITAL ENCOUNTER (OUTPATIENT)
Dept: GENERAL RADIOLOGY | Age: 63
End: 2023-09-30
Payer: COMMERCIAL

## 2023-09-30 DIAGNOSIS — M25.561 ACUTE PAIN OF RIGHT KNEE: ICD-10-CM

## 2023-09-30 DIAGNOSIS — M25.562 ACUTE PAIN OF LEFT KNEE: ICD-10-CM

## 2023-09-30 PROCEDURE — 73562 X-RAY EXAM OF KNEE 3: CPT

## 2023-11-08 ENCOUNTER — OFFICE VISIT (OUTPATIENT)
Dept: ORTHOPEDIC SURGERY | Age: 63
End: 2023-11-08

## 2023-11-08 VITALS — WEIGHT: 205 LBS | BODY MASS INDEX: 36.32 KG/M2 | HEIGHT: 63 IN | RESPIRATION RATE: 14 BRPM

## 2023-11-08 DIAGNOSIS — M75.81 RIGHT ROTATOR CUFF TENDONITIS: Primary | ICD-10-CM

## 2023-11-08 DIAGNOSIS — M25.511 RIGHT SHOULDER PAIN, UNSPECIFIED CHRONICITY: ICD-10-CM

## 2023-11-08 DIAGNOSIS — M75.21 BICEPS TENDONITIS ON RIGHT: ICD-10-CM

## 2023-11-08 RX ORDER — LIDOCAINE HYDROCHLORIDE 10 MG/ML
3 INJECTION, SOLUTION INFILTRATION; PERINEURAL ONCE
Status: COMPLETED | OUTPATIENT
Start: 2023-11-08 | End: 2023-11-08

## 2023-11-08 RX ORDER — TRIAMCINOLONE ACETONIDE 40 MG/ML
40 INJECTION, SUSPENSION INTRA-ARTICULAR; INTRAMUSCULAR ONCE
Status: COMPLETED | OUTPATIENT
Start: 2023-11-08 | End: 2023-11-08

## 2023-11-08 RX ORDER — LIDOCAINE HYDROCHLORIDE 10 MG/ML
5 INJECTION, SOLUTION INFILTRATION; PERINEURAL ONCE
Status: CANCELLED | OUTPATIENT
Start: 2023-11-08 | End: 2023-11-08

## 2023-11-08 RX ADMIN — LIDOCAINE HYDROCHLORIDE 3 ML: 10 INJECTION, SOLUTION INFILTRATION; PERINEURAL at 08:58

## 2023-11-08 RX ADMIN — TRIAMCINOLONE ACETONIDE 40 MG: 40 INJECTION, SUSPENSION INTRA-ARTICULAR; INTRAMUSCULAR at 08:59

## 2023-11-08 NOTE — PROGRESS NOTES
conservative management. Consequently, a prescription for therapy was provided. We also discussed use of a prescription NSAID versus a cortisone injection and at this time she has elected to proceed with a cortisone injection which was administered as outlined below. I'll see her back my clinic in 3 months for reevaluation but she was encouraged to return or call earlier with questions and/or concerns. Procedure: right shoulder subacromial space injection  Following an appropriate discussion with the patient regarding the risks and benefits of the procedure she consented to proceed. her right shoulder was prepped using chlorhexadine solution. Using aseptic technique and through a posterior approach, her right shoulder subacromial space was injected with a 4 cc mixture of 1cc 40mg/ml kenalog and 3 cc of 1% lidocaine without epinephrine. A band aid was applied to the injection site. she tolerated the injection with no immediate adverse reactions. This note is created with the assistance of a speech recognition program.  While intending to generate adocument that actually reflects the content of the visit, the document can still have some errors including those of syntax and sound a like substitutions which may escape proof reading. It such instances, actual meaningcan be extrapolated by contextual diversion.     NA = Not assessed  RTC = Rotator cuff  RCT = Rotator cuff tear  ER = External rotation  IR = Internal rotation  AC = Acromioclavicular  GH = Glenohumeral  n = No  y = Yes

## 2023-11-19 ENCOUNTER — APPOINTMENT (OUTPATIENT)
Dept: GENERAL RADIOLOGY | Age: 63
End: 2023-11-19
Payer: COMMERCIAL

## 2023-11-19 ENCOUNTER — HOSPITAL ENCOUNTER (EMERGENCY)
Age: 63
Discharge: HOME | End: 2023-11-19
Attending: EMERGENCY MEDICINE
Payer: COMMERCIAL

## 2023-11-19 VITALS
OXYGEN SATURATION: 99 % | SYSTOLIC BLOOD PRESSURE: 144 MMHG | HEIGHT: 63 IN | TEMPERATURE: 98.2 F | BODY MASS INDEX: 37.03 KG/M2 | HEART RATE: 102 BPM | RESPIRATION RATE: 16 BRPM | DIASTOLIC BLOOD PRESSURE: 76 MMHG | WEIGHT: 209 LBS

## 2023-11-19 DIAGNOSIS — R07.9 CHEST PAIN, UNSPECIFIED TYPE: Primary | ICD-10-CM

## 2023-11-19 LAB
ALBUMIN SERPL-MCNC: 4.5 G/DL (ref 3.5–5.2)
ALBUMIN/GLOB SERPL: 1.7 {RATIO} (ref 1–2.5)
ALP SERPL-CCNC: 166 U/L (ref 35–104)
ALT SERPL-CCNC: 16 U/L (ref 5–33)
ANION GAP SERPL CALCULATED.3IONS-SCNC: 10 MMOL/L (ref 9–17)
AST SERPL-CCNC: 13 U/L
BASOPHILS # BLD: 0.1 K/UL (ref 0–0.2)
BASOPHILS NFR BLD: 2 % (ref 0–2)
BILIRUB DIRECT SERPL-MCNC: 0.1 MG/DL
BILIRUB INDIRECT SERPL-MCNC: 0.4 MG/DL (ref 0–1)
BILIRUB SERPL-MCNC: 0.5 MG/DL (ref 0.3–1.2)
BNP SERPL-MCNC: 97 PG/ML
BUN SERPL-MCNC: 14 MG/DL (ref 8–23)
CALCIUM SERPL-MCNC: 9.4 MG/DL (ref 8.6–10.4)
CHLORIDE SERPL-SCNC: 101 MMOL/L (ref 98–107)
CO2 SERPL-SCNC: 27 MMOL/L (ref 20–31)
CREAT SERPL-MCNC: 1 MG/DL (ref 0.5–0.9)
EOSINOPHIL # BLD: 0.2 K/UL (ref 0–0.4)
EOSINOPHILS RELATIVE PERCENT: 3 % (ref 1–4)
ERYTHROCYTE [DISTWIDTH] IN BLOOD BY AUTOMATED COUNT: 13.7 % (ref 12.5–15.4)
GFR SERPL CREATININE-BSD FRML MDRD: >60 ML/MIN/1.73M2
GLUCOSE SERPL-MCNC: 98 MG/DL (ref 70–99)
HCT VFR BLD AUTO: 41.3 % (ref 36–46)
HGB BLD-MCNC: 14 G/DL (ref 12–16)
LIPASE SERPL-CCNC: 38 U/L (ref 13–60)
LYMPHOCYTES NFR BLD: 2.2 K/UL (ref 1–4.8)
LYMPHOCYTES RELATIVE PERCENT: 27 % (ref 24–44)
MAGNESIUM SERPL-MCNC: 2.4 MG/DL (ref 1.6–2.6)
MCH RBC QN AUTO: 29.5 PG (ref 26–34)
MCHC RBC AUTO-ENTMCNC: 33.8 G/DL (ref 31–37)
MCV RBC AUTO: 87.4 FL (ref 80–100)
MONOCYTES NFR BLD: 0.6 K/UL (ref 0.1–1.2)
MONOCYTES NFR BLD: 8 % (ref 2–11)
NEUTROPHILS NFR BLD: 60 % (ref 36–66)
NEUTS SEG NFR BLD: 5 K/UL (ref 1.8–7.7)
PLATELET # BLD AUTO: 349 K/UL (ref 140–450)
PMV BLD AUTO: 7.8 FL (ref 6–12)
POTASSIUM SERPL-SCNC: 3.5 MMOL/L (ref 3.7–5.3)
PROT SERPL-MCNC: 7.2 G/DL (ref 6.4–8.3)
RBC # BLD AUTO: 4.73 M/UL (ref 4–5.2)
SODIUM SERPL-SCNC: 138 MMOL/L (ref 135–144)
TROPONIN I SERPL HS-MCNC: 10 NG/L (ref 0–14)
TROPONIN I SERPL HS-MCNC: 11 NG/L (ref 0–14)
WBC OTHER # BLD: 8 K/UL (ref 3.5–11)

## 2023-11-19 PROCEDURE — 93005 ELECTROCARDIOGRAM TRACING: CPT | Performed by: EMERGENCY MEDICINE

## 2023-11-19 PROCEDURE — 80048 BASIC METABOLIC PNL TOTAL CA: CPT

## 2023-11-19 PROCEDURE — 2580000003 HC RX 258: Performed by: EMERGENCY MEDICINE

## 2023-11-19 PROCEDURE — 96375 TX/PRO/DX INJ NEW DRUG ADDON: CPT

## 2023-11-19 PROCEDURE — 85025 COMPLETE CBC W/AUTO DIFF WBC: CPT

## 2023-11-19 PROCEDURE — 2500000003 HC RX 250 WO HCPCS: Performed by: EMERGENCY MEDICINE

## 2023-11-19 PROCEDURE — 6360000002 HC RX W HCPCS: Performed by: EMERGENCY MEDICINE

## 2023-11-19 PROCEDURE — 36415 COLL VENOUS BLD VENIPUNCTURE: CPT

## 2023-11-19 PROCEDURE — 83690 ASSAY OF LIPASE: CPT

## 2023-11-19 PROCEDURE — 96374 THER/PROPH/DIAG INJ IV PUSH: CPT

## 2023-11-19 PROCEDURE — 83735 ASSAY OF MAGNESIUM: CPT

## 2023-11-19 PROCEDURE — 83880 ASSAY OF NATRIURETIC PEPTIDE: CPT

## 2023-11-19 PROCEDURE — 99285 EMERGENCY DEPT VISIT HI MDM: CPT

## 2023-11-19 PROCEDURE — 6370000000 HC RX 637 (ALT 250 FOR IP): Performed by: EMERGENCY MEDICINE

## 2023-11-19 PROCEDURE — 80076 HEPATIC FUNCTION PANEL: CPT

## 2023-11-19 PROCEDURE — 71046 X-RAY EXAM CHEST 2 VIEWS: CPT

## 2023-11-19 PROCEDURE — 84484 ASSAY OF TROPONIN QUANT: CPT

## 2023-11-19 PROCEDURE — A4216 STERILE WATER/SALINE, 10 ML: HCPCS | Performed by: EMERGENCY MEDICINE

## 2023-11-19 RX ORDER — MORPHINE SULFATE 4 MG/ML
4 INJECTION, SOLUTION INTRAMUSCULAR; INTRAVENOUS ONCE
Status: DISCONTINUED | OUTPATIENT
Start: 2023-11-19 | End: 2023-11-19

## 2023-11-19 RX ORDER — ASPIRIN 81 MG/1
324 TABLET, CHEWABLE ORAL ONCE
Status: COMPLETED | OUTPATIENT
Start: 2023-11-19 | End: 2023-11-19

## 2023-11-19 RX ORDER — MORPHINE SULFATE 2 MG/ML
2 INJECTION, SOLUTION INTRAMUSCULAR; INTRAVENOUS ONCE
Status: COMPLETED | OUTPATIENT
Start: 2023-11-19 | End: 2023-11-19

## 2023-11-19 RX ADMIN — FAMOTIDINE 20 MG: 10 INJECTION INTRAVENOUS at 13:10

## 2023-11-19 RX ADMIN — MORPHINE SULFATE 2 MG: 2 INJECTION, SOLUTION INTRAMUSCULAR; INTRAVENOUS at 14:51

## 2023-11-19 RX ADMIN — ASPIRIN 324 MG: 81 TABLET, CHEWABLE ORAL at 12:24

## 2023-11-19 ASSESSMENT — PAIN SCALES - GENERAL: PAINLEVEL_OUTOF10: 10

## 2023-11-19 ASSESSMENT — PAIN DESCRIPTION - DESCRIPTORS: DESCRIPTORS: PRESSURE

## 2023-11-19 ASSESSMENT — ENCOUNTER SYMPTOMS
CHEST TIGHTNESS: 1
BACK PAIN: 0
VOMITING: 0
SHORTNESS OF BREATH: 1
COUGH: 0
NAUSEA: 0

## 2023-11-19 ASSESSMENT — PAIN - FUNCTIONAL ASSESSMENT: PAIN_FUNCTIONAL_ASSESSMENT: 0-10

## 2023-11-19 ASSESSMENT — PAIN DESCRIPTION - LOCATION: LOCATION: CHEST
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
## 2023-11-20 ENCOUNTER — HOSPITAL ENCOUNTER (OUTPATIENT)
Age: 63
Setting detail: THERAPIES SERIES
Discharge: HOME | End: 2023-11-20

## 2023-11-20 ENCOUNTER — HOSPITAL ENCOUNTER (EMERGENCY)
Age: 63
Discharge: HOME | End: 2023-11-20
Attending: EMERGENCY MEDICINE
Payer: COMMERCIAL

## 2023-11-20 ENCOUNTER — APPOINTMENT (OUTPATIENT)
Dept: CT IMAGING | Age: 63
End: 2023-11-20
Payer: COMMERCIAL

## 2023-11-20 VITALS
HEIGHT: 63 IN | WEIGHT: 207 LBS | HEART RATE: 63 BPM | TEMPERATURE: 97.7 F | SYSTOLIC BLOOD PRESSURE: 125 MMHG | RESPIRATION RATE: 16 BRPM | BODY MASS INDEX: 36.68 KG/M2 | DIASTOLIC BLOOD PRESSURE: 53 MMHG | OXYGEN SATURATION: 96 %

## 2023-11-20 DIAGNOSIS — R07.9 CHEST PAIN, UNSPECIFIED TYPE: Primary | ICD-10-CM

## 2023-11-20 DIAGNOSIS — R55 NEAR SYNCOPE: ICD-10-CM

## 2023-11-20 DIAGNOSIS — R51.9 NONINTRACTABLE HEADACHE, UNSPECIFIED CHRONICITY PATTERN, UNSPECIFIED HEADACHE TYPE: ICD-10-CM

## 2023-11-20 DIAGNOSIS — F41.1 ANXIETY STATE: ICD-10-CM

## 2023-11-20 LAB
D DIMER PPP FEU-MCNC: 0.24 UG/ML FEU
EKG ATRIAL RATE: 87 BPM
EKG P AXIS: 34 DEGREES
EKG P-R INTERVAL: 178 MS
EKG Q-T INTERVAL: 348 MS
EKG QRS DURATION: 80 MS
EKG QTC CALCULATION (BAZETT): 418 MS
EKG R AXIS: 31 DEGREES
EKG T AXIS: 15 DEGREES
EKG VENTRICULAR RATE: 87 BPM
TROPONIN I SERPL HS-MCNC: 8 NG/L (ref 0–14)

## 2023-11-20 PROCEDURE — 85379 FIBRIN DEGRADATION QUANT: CPT

## 2023-11-20 PROCEDURE — 99284 EMERGENCY DEPT VISIT MOD MDM: CPT

## 2023-11-20 PROCEDURE — 36415 COLL VENOUS BLD VENIPUNCTURE: CPT

## 2023-11-20 PROCEDURE — 84484 ASSAY OF TROPONIN QUANT: CPT

## 2023-11-20 PROCEDURE — 70450 CT HEAD/BRAIN W/O DYE: CPT

## 2023-11-20 PROCEDURE — 93005 ELECTROCARDIOGRAM TRACING: CPT | Performed by: NURSE PRACTITIONER

## 2023-11-20 ASSESSMENT — PAIN - FUNCTIONAL ASSESSMENT: PAIN_FUNCTIONAL_ASSESSMENT: 0-10

## 2023-11-20 ASSESSMENT — PAIN SCALES - GENERAL: PAINLEVEL_OUTOF10: 0

## 2023-11-20 ASSESSMENT — ENCOUNTER SYMPTOMS
SHORTNESS OF BREATH: 1
COUGH: 0

## 2023-11-20 NOTE — DISCHARGE INSTRUCTIONS
Please call your neurologist advised of the headache and need to be seen. You may need further outpatient studies such as repeat MRI if symptoms persist.  Please advise that this is an ER follow-up. Follow-up with your family doctor as scheduled tomorrow. Review the last 2 days work-up with them. I have provided a cardiology referral.  Request an appointment with that service. They do have multiple locations available. If symptoms worsen or new concerns develop return to the emergency room.

## 2023-11-20 NOTE — FLOWSHEET NOTE
[address], [address]  [facility] [address]  [phone]  F: [phone]     Physical Therapy Cancel/No Show note    Date: 2023  Patient: Haley Heredia  : 1960  MRN: [id number]    Cancels/No Shows to date:     For today's appointment patient:    [x]  Cancelled    [] Rescheduled appointment    [] No-show     Reason given by patient:    []  Patient ill    []  Conflicting appointment    [] No transportation      [] Conflict with work    [] No reason given    [] Weather related    [] COVID-19    [] Other:      Comments:  Pt went to ER for chest pain.        [] Next appointment was confirmed    Electronically signed by: Thalia Caballero PT
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
## 2023-11-21 LAB
EKG ATRIAL RATE: 65 BPM
EKG P AXIS: 15 DEGREES
EKG P-R INTERVAL: 178 MS
EKG Q-T INTERVAL: 416 MS
EKG QRS DURATION: 82 MS
EKG QTC CALCULATION (BAZETT): 432 MS
EKG R AXIS: 54 DEGREES
EKG T AXIS: 26 DEGREES
EKG VENTRICULAR RATE: 65 BPM

## 2023-11-27 ENCOUNTER — TRANSCRIBE ORDERS (OUTPATIENT)
Dept: ADMINISTRATIVE | Age: 63
End: 2023-11-27

## 2023-11-27 DIAGNOSIS — G93.5 CHIARI MALFORMATION TYPE I (HCC): Primary | ICD-10-CM

## 2023-11-27 DIAGNOSIS — R51.9 GENERALIZED HEADACHE: ICD-10-CM

## 2023-12-04 ENCOUNTER — HOSPITAL ENCOUNTER (OUTPATIENT)
Dept: NUCLEAR MEDICINE | Age: 63
Discharge: HOME | End: 2023-12-06
Payer: COMMERCIAL

## 2023-12-04 ENCOUNTER — HOSPITAL ENCOUNTER (OUTPATIENT)
Age: 63
Discharge: HOME | End: 2023-12-06
Payer: COMMERCIAL

## 2023-12-04 VITALS
HEIGHT: 63 IN | BODY MASS INDEX: 36.68 KG/M2 | DIASTOLIC BLOOD PRESSURE: 53 MMHG | SYSTOLIC BLOOD PRESSURE: 125 MMHG | WEIGHT: 207 LBS | HEART RATE: 63 BPM

## 2023-12-04 DIAGNOSIS — R01.1 MURMUR: ICD-10-CM

## 2023-12-04 DIAGNOSIS — I20.89 ANGINA DECUBITUS: ICD-10-CM

## 2023-12-04 LAB
ECHO AO ROOT DIAM: 3.3 CM
ECHO AO ROOT INDEX: 1.68 CM/M2
ECHO AV AREA PEAK VELOCITY: 3.1 CM2
ECHO AV AREA VTI: 3 CM2
ECHO AV AREA/BSA PEAK VELOCITY: 1.6 CM2/M2
ECHO AV AREA/BSA VTI: 1.5 CM2/M2
ECHO AV MEAN GRADIENT: 4 MMHG
ECHO AV MEAN VELOCITY: 1 M/S
ECHO AV PEAK GRADIENT: 9 MMHG
ECHO AV PEAK VELOCITY: 1.5 M/S
ECHO AV VELOCITY RATIO: 1
ECHO AV VTI: 32.2 CM
ECHO BSA: 2.04 M2
ECHO BSA: 2.04 M2
ECHO LA AREA 2C: 15.1 CM2
ECHO LA AREA 4C: 13.2 CM2
ECHO LA DIAMETER INDEX: 1.94 CM/M2
ECHO LA DIAMETER: 3.8 CM
ECHO LA MAJOR AXIS: 4.8 CM
ECHO LA MINOR AXIS: 5.2 CM
ECHO LA TO AORTIC ROOT RATIO: 1.15
ECHO LA VOL BP: 33 ML (ref 22–52)
ECHO LA VOL MOD A2C: 35 ML (ref 22–52)
ECHO LA VOL MOD A4C: 29 ML (ref 22–52)
ECHO LA VOL/BSA BIPLANE: 17 ML/M2 (ref 16–34)
ECHO LA VOLUME INDEX MOD A2C: 18 ML/M2 (ref 16–34)
ECHO LA VOLUME INDEX MOD A4C: 15 ML/M2 (ref 16–34)
ECHO LV E' LATERAL VELOCITY: 11 CM/S
ECHO LV E' SEPTAL VELOCITY: 8 CM/S
ECHO LV FRACTIONAL SHORTENING: 29 % (ref 28–44)
ECHO LV INTERNAL DIMENSION DIASTOLE INDEX: 2.14 CM/M2
ECHO LV INTERNAL DIMENSION DIASTOLIC: 4.2 CM (ref 3.9–5.3)
ECHO LV INTERNAL DIMENSION SYSTOLIC INDEX: 1.53 CM/M2
ECHO LV INTERNAL DIMENSION SYSTOLIC: 3 CM
ECHO LV IVSD: 1.1 CM (ref 0.6–0.9)
ECHO LV MASS 2D: 167.4 G (ref 67–162)
ECHO LV MASS INDEX 2D: 85.4 G/M2 (ref 43–95)
ECHO LV POSTERIOR WALL DIASTOLIC: 1.2 CM (ref 0.6–0.9)
ECHO LV RELATIVE WALL THICKNESS RATIO: 0.57
ECHO LVOT AREA: 3.1 CM2
ECHO LVOT AV VTI INDEX: 0.97
ECHO LVOT DIAM: 2 CM
ECHO LVOT MEAN GRADIENT: 5 MMHG
ECHO LVOT PEAK GRADIENT: 9 MMHG
ECHO LVOT PEAK VELOCITY: 1.5 M/S
ECHO LVOT STROKE VOLUME INDEX: 50 ML/M2
ECHO LVOT SV: 98 ML
ECHO LVOT VTI: 31.2 CM
ECHO MV A VELOCITY: 0.88 M/S
ECHO MV AREA VTI: 3.2 CM2
ECHO MV E DECELERATION TIME (DT): 173 MS
ECHO MV E VELOCITY: 0.89 M/S
ECHO MV E/A RATIO: 1.01
ECHO MV E/E' LATERAL: 8.09
ECHO MV E/E' RATIO (AVERAGED): 9.61
ECHO MV LVOT VTI INDEX: 0.98
ECHO MV MAX VELOCITY: 1 M/S
ECHO MV MEAN GRADIENT: 2 MMHG
ECHO MV MEAN VELOCITY: 0.6 M/S
ECHO MV PEAK GRADIENT: 4 MMHG
ECHO MV VTI: 30.6 CM
ECHO RV TAPSE: 1.6 CM (ref 1.7–?)
ECHO TV REGURGITANT MAX VELOCITY: 1.84 M/S
ECHO TV REGURGITANT PEAK GRADIENT: 14 MMHG
STRESS BASELINE DIAS BP: 58 MMHG
STRESS BASELINE HR: 78 BPM
STRESS BASELINE SYS BP: 134 MMHG
STRESS ESTIMATED WORKLOAD: 1 METS
STRESS PEAK DIAS BP: 59 MMHG
STRESS PEAK SYS BP: 146 MMHG
STRESS PERCENT HR ACHIEVED: 62 %
STRESS POST PEAK HR: 97 BPM
STRESS RATE PRESSURE PRODUCT: NORMAL BPM*MMHG
STRESS STAGE RECOVERY 1 BP: NORMAL MMHG
STRESS STAGE RECOVERY 1 DURATION: 1 MIN:SEC
STRESS STAGE RECOVERY 1 HR: 95 BPM
STRESS STAGE RECOVERY 2 BP: NORMAL MMHG
STRESS STAGE RECOVERY 2 DURATION: 7 MIN:SEC
STRESS STAGE RECOVERY 2 HR: 88 BPM
STRESS TARGET HR: 156 BPM

## 2023-12-04 PROCEDURE — 2580000003 HC RX 258: Performed by: NURSE PRACTITIONER

## 2023-12-04 PROCEDURE — 93017 CV STRESS TEST TRACING ONLY: CPT

## 2023-12-04 PROCEDURE — 93306 TTE W/DOPPLER COMPLETE: CPT | Performed by: INTERNAL MEDICINE

## 2023-12-04 PROCEDURE — 3430000000 HC RX DIAGNOSTIC RADIOPHARMACEUTICAL: Performed by: NURSE PRACTITIONER

## 2023-12-04 PROCEDURE — 93306 TTE W/DOPPLER COMPLETE: CPT

## 2023-12-04 PROCEDURE — A9500 TC99M SESTAMIBI: HCPCS | Performed by: NURSE PRACTITIONER

## 2023-12-04 PROCEDURE — 78452 HT MUSCLE IMAGE SPECT MULT: CPT

## 2023-12-04 PROCEDURE — 6360000002 HC RX W HCPCS: Performed by: INTERNAL MEDICINE

## 2023-12-04 RX ORDER — TETRAKIS(2-METHOXYISOBUTYLISOCYANIDE)COPPER(I) TETRAFLUOROBORATE 1 MG/ML
10 INJECTION, POWDER, LYOPHILIZED, FOR SOLUTION INTRAVENOUS
Status: COMPLETED | OUTPATIENT
Start: 2023-12-04 | End: 2023-12-04

## 2023-12-04 RX ORDER — SODIUM CHLORIDE 9 MG/ML
500 INJECTION, SOLUTION INTRAVENOUS CONTINUOUS PRN
Status: ACTIVE | OUTPATIENT
Start: 2023-12-04 | End: 2023-12-04

## 2023-12-04 RX ORDER — SODIUM CHLORIDE 0.9 % (FLUSH) 0.9 %
10 SYRINGE (ML) INJECTION PRN
Status: DISCONTINUED | OUTPATIENT
Start: 2023-12-04 | End: 2023-12-07 | Stop reason: HOSPADM

## 2023-12-04 RX ORDER — METOPROLOL TARTRATE 1 MG/ML
5 INJECTION, SOLUTION INTRAVENOUS EVERY 5 MIN PRN
Status: ACTIVE | OUTPATIENT
Start: 2023-12-04 | End: 2023-12-04

## 2023-12-04 RX ORDER — NITROGLYCERIN 0.4 MG/1
0.4 TABLET SUBLINGUAL EVERY 5 MIN PRN
Status: ACTIVE | OUTPATIENT
Start: 2023-12-04 | End: 2023-12-04

## 2023-12-04 RX ORDER — TETRAKIS(2-METHOXYISOBUTYLISOCYANIDE)COPPER(I) TETRAFLUOROBORATE 1 MG/ML
30 INJECTION, POWDER, LYOPHILIZED, FOR SOLUTION INTRAVENOUS
Status: COMPLETED | OUTPATIENT
Start: 2023-12-04 | End: 2023-12-04

## 2023-12-04 RX ORDER — SODIUM CHLORIDE 0.9 % (FLUSH) 0.9 %
5-40 SYRINGE (ML) INJECTION PRN
Status: ACTIVE | OUTPATIENT
Start: 2023-12-04 | End: 2023-12-04

## 2023-12-04 RX ORDER — REGADENOSON 0.08 MG/ML
0.4 INJECTION, SOLUTION INTRAVENOUS
Status: COMPLETED | OUTPATIENT
Start: 2023-12-04 | End: 2023-12-04

## 2023-12-04 RX ORDER — ATROPINE SULFATE 0.1 MG/ML
0.5 INJECTION INTRAVENOUS EVERY 5 MIN PRN
Status: ACTIVE | OUTPATIENT
Start: 2023-12-04 | End: 2023-12-04

## 2023-12-04 RX ORDER — ALBUTEROL SULFATE 90 UG/1
2 AEROSOL, METERED RESPIRATORY (INHALATION) PRN
Status: ACTIVE | OUTPATIENT
Start: 2023-12-04 | End: 2023-12-04

## 2023-12-04 RX ORDER — AMINOPHYLLINE 25 MG/ML
50 INJECTION, SOLUTION INTRAVENOUS PRN
Status: ACTIVE | OUTPATIENT
Start: 2023-12-04 | End: 2023-12-04

## 2023-12-04 RX ADMIN — Medication 36 MILLICURIE: at 09:09

## 2023-12-04 RX ADMIN — REGADENOSON 0.4 MG: 0.08 INJECTION, SOLUTION INTRAVENOUS at 09:07

## 2023-12-04 RX ADMIN — SODIUM CHLORIDE, PRESERVATIVE FREE 10 ML: 5 INJECTION INTRAVENOUS at 07:55

## 2023-12-04 RX ADMIN — Medication 11.5 MILLICURIE: at 07:55

## 2023-12-14 ENCOUNTER — HOSPITAL ENCOUNTER (OUTPATIENT)
Dept: MRI IMAGING | Age: 63
Discharge: HOME | End: 2023-12-16
Payer: COMMERCIAL

## 2023-12-14 DIAGNOSIS — R51.9 GENERALIZED HEADACHE: ICD-10-CM

## 2023-12-14 DIAGNOSIS — G93.5 CHIARI MALFORMATION TYPE I (HCC): ICD-10-CM

## 2023-12-14 LAB
CREAT SERPL-MCNC: 1 MG/DL (ref 0.5–0.9)
GFR SERPL CREATININE-BSD FRML MDRD: >60 ML/MIN/1.73M2

## 2023-12-14 PROCEDURE — A9579 GAD-BASE MR CONTRAST NOS,1ML: HCPCS | Performed by: NURSE PRACTITIONER

## 2023-12-14 PROCEDURE — 6360000004 HC RX CONTRAST MEDICATION: Performed by: NURSE PRACTITIONER

## 2023-12-14 PROCEDURE — 36415 COLL VENOUS BLD VENIPUNCTURE: CPT

## 2023-12-14 PROCEDURE — 82565 ASSAY OF CREATININE: CPT

## 2023-12-14 PROCEDURE — 2580000003 HC RX 258: Performed by: NURSE PRACTITIONER

## 2023-12-14 PROCEDURE — 70553 MRI BRAIN STEM W/O & W/DYE: CPT

## 2023-12-14 RX ORDER — SODIUM CHLORIDE 0.9 % (FLUSH) 0.9 %
10 SYRINGE (ML) INJECTION ONCE
Status: COMPLETED | OUTPATIENT
Start: 2023-12-14 | End: 2023-12-14

## 2023-12-14 RX ADMIN — SODIUM CHLORIDE, PRESERVATIVE FREE 10 ML: 5 INJECTION INTRAVENOUS at 09:08

## 2023-12-14 RX ADMIN — GADOTERIDOL 19 ML: 279.3 INJECTION, SOLUTION INTRAVENOUS at 09:08

## 2024-02-19 ENCOUNTER — TELEPHONE (OUTPATIENT)
Age: 64
End: 2024-02-19

## 2024-02-19 NOTE — TELEPHONE ENCOUNTER
Patient called requesting to  her medical records in the office from Dr Pack.  She states she will be out running errands tomorrow, 2/20, and would like to stop in a that time.  She states this is her second attempt to speak to the office regarding this and no one has returned her call.  Please contact her at the earliest convenience to discuss.    Thank you.

## 2024-08-20 ENCOUNTER — OFFICE VISIT (OUTPATIENT)
Age: 64
End: 2024-08-20
Payer: COMMERCIAL

## 2024-08-20 VITALS — BODY MASS INDEX: 36.86 KG/M2 | HEIGHT: 63 IN | WEIGHT: 208 LBS

## 2024-08-20 DIAGNOSIS — M25.511 RIGHT SHOULDER PAIN, UNSPECIFIED CHRONICITY: Primary | ICD-10-CM

## 2024-08-20 DIAGNOSIS — M77.01 MEDIAL EPICONDYLITIS OF RIGHT ELBOW: ICD-10-CM

## 2024-08-20 PROCEDURE — 99214 OFFICE O/P EST MOD 30 MIN: CPT

## 2024-08-20 PROCEDURE — 20551 NJX 1 TENDON ORIGIN/INSJ: CPT

## 2024-08-20 PROCEDURE — 1123F ACP DISCUSS/DSCN MKR DOCD: CPT

## 2024-08-20 RX ADMIN — METHYLPREDNISOLONE ACETATE 80 MG: 80 INJECTION, SUSPENSION INTRA-ARTICULAR; INTRALESIONAL; INTRAMUSCULAR; SOFT TISSUE at 10:38

## 2024-08-20 RX ADMIN — LIDOCAINE HYDROCHLORIDE 2 ML: 10 INJECTION, SOLUTION INFILTRATION; PERINEURAL at 10:37

## 2024-08-20 NOTE — PROGRESS NOTES
[facility] Orthopaedics & Sports Medicine      [facility]IC[facility]  [address]  [place] [address]  Dept: [phone]  Dept Fax: [phone]    Chief Compliant:  Chief Complaint   Patient presents with    Follow-up     Right elbow        History of Present Illness:  This is a pleasant 64 y.o. female who presents to the clinic today for evaluation / follow up of right shoulder and right elbow pain.  Patient states that she has had ongoing pain in her right shoulder for years at this point.  She states that she has had multiple injection of the subacromial space without much relief at this point.  She has not had any MRI in the past.  She also states that she has pain in the right elbow.  She points to the medial aspect of her elbow.  She states that this has been ongoing for months.  She states that her pain was really severe the past few days but has since calm down a bit.  She has been taking tramadol and aleve for pain.  She presents today for further evaluation.      Physical Exam:    Right shoulder: Patient is able to forward flex her shoulder to roughly under 170 degrees on exam today.  She can reach behind her head.  She does have quite a bit of pain with this range of motion.  To her thoracic spine.  She has pain with rotation of the shoulder.  She has a positive speeds test.  Negative Neer impingement test she has 4-5 strength to resisted forward flexion.  She has tenderness palpation along the glenohumeral joint line.  She also is tenderness along the bicipital groove.  She has some tenderness palpation over the AC joint.    Right knee right elbow: Patient is able to fully flex and extend her right elbow.  No tenderness over the lateral epicondyle.  No tenderness over the she does have tenderness palpation of the medial epicondyle.  She has some discomfort with resisted wrist flexion.  No

## 2024-08-21 RX ORDER — METHYLPREDNISOLONE ACETATE 80 MG/ML
80 INJECTION, SUSPENSION INTRA-ARTICULAR; INTRALESIONAL; INTRAMUSCULAR; SOFT TISSUE ONCE
Status: COMPLETED | OUTPATIENT
Start: 2024-08-21 | End: 2024-08-20

## 2024-08-21 RX ORDER — LIDOCAINE HYDROCHLORIDE 10 MG/ML
2 INJECTION, SOLUTION INFILTRATION; PERINEURAL ONCE
Status: COMPLETED | OUTPATIENT
Start: 2024-08-21 | End: 2024-08-20

## 2024-09-04 ENCOUNTER — TELEPHONE (OUTPATIENT)
Age: 64
End: 2024-09-04

## 2024-09-04 NOTE — TELEPHONE ENCOUNTER
Patient called in to see if she can get order for MRI SHOULDER RIGHT WO CONTRAST faxed to [facility] FAX#[phone]'  Please advise  Thank you

## 2025-04-28 ENCOUNTER — HOSPITAL ENCOUNTER (OUTPATIENT)
Age: 65
Setting detail: THERAPIES SERIES
Discharge: HOME | End: 2025-04-28
Payer: COMMERCIAL

## 2025-04-28 PROCEDURE — 97162 PT EVAL MOD COMPLEX 30 MIN: CPT

## 2025-04-28 PROCEDURE — 97110 THERAPEUTIC EXERCISES: CPT

## 2025-04-28 NOTE — CONSULTS
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  Patient stated she had an anticipated rotator cuff repair on 3/26/25 secondary to long standing shoulder pain and positive MRI, but ended up having just a \"clean out\" of the joint as well as biceps tenotomy on 3/26/25 due to lack of full tear.  This was confirmed by operative report that was faxed by physician office, although repair protocol was sent to us along with operative report.  Assume typical protocol for subacromial decompression, biceps tenotomy, and labral and rotator cuff debridgement per operative report.  Patient stated she was doing \"really well\" prior to this weekend, but was doing some repetitive lifting/carrying of her dog and picking up luggage over the weekend and the shoulder is \"really painful\" right now.  Patient notes limitations with washing hair, with reaching fully behind body, with pulling up pants, and then with lifting/carrying of luggage and dog.  CC:complaints of diffuse shoulder pain, particularly in the (R) biceps region and anterior joint  HPI: surgical intervention on 3/26/25    PMHx: [] Unremarkable [] Diabetes [x] HTN  [] Pacemaker   [] MI/Heart Problems [] Cancer [x] Arthritis [] Other:              [x] Refer to full medical

## 2025-04-30 ENCOUNTER — HOSPITAL ENCOUNTER (OUTPATIENT)
Age: 65
Setting detail: THERAPIES SERIES
Discharge: HOME | End: 2025-04-30
Payer: COMMERCIAL

## 2025-04-30 PROCEDURE — 97140 MANUAL THERAPY 1/> REGIONS: CPT

## 2025-04-30 PROCEDURE — 97110 THERAPEUTIC EXERCISES: CPT

## 2025-04-30 NOTE — FLOWSHEET NOTE
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  Has not been doing pulleys at home as she did not order them online.     Objective:         25 end range PROM sharp pain, PROM ER 80 degrees, IR 80 degrees, AAROM flexion 140 degrees    Precautions: NO weight restriction per general arthroscopic protocol        Treatment provided:   CP end of treatment 10' seated- not counted in billable time  Exercise Reps/ Time Weight/ Level Comments   UBE 1.5/1.5     pulleys 3'           Elbow flexion/extension 15     Wand ER 10 x 5\"     Wand bench press 15x 1#    Wand shoulder flexion 15x 1#                                                                            Manual   Joint mobilization- inferior glide, anterior glide, posterior glide grade 2-3; PROM in conjunction with mobilization     Response to treatment:  Tolerated treatment moderately well.  Did get some pain with full elevation with the wand.  We did verbally give the patient wand bench and overhead ROM to do at home as she has not

## 2025-05-05 ENCOUNTER — HOSPITAL ENCOUNTER (OUTPATIENT)
Age: 65
Setting detail: THERAPIES SERIES
Discharge: HOME | End: 2025-05-05
Payer: COMMERCIAL

## 2025-05-05 PROCEDURE — 97110 THERAPEUTIC EXERCISES: CPT

## 2025-05-05 PROCEDURE — 97140 MANUAL THERAPY 1/> REGIONS: CPT

## 2025-05-05 NOTE — FLOWSHEET NOTE
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  Was trying to clean multiple cupboards out and lift/carry cans and other objects.  We discussed decreasing activity level as this irritated her and she additionally initially noted good tolerance to surgical intervention until overuse/repetitive lifting/carrying.    Objective:         25 end range PROM sharp pain, PROM ER 80 degrees, IR 80 degrees, AAROM flexion 140 degrees    Precautions: NO weight restriction per general arthroscopic protocol        Treatment provided:   CP end of treatment 10' seated- not counted in billable time  Exercise Reps/ Time Weight/ Level Comments   UBE 1.5/1.5     pulleys 3'           Elbow flexion/extension 15     Wand ER 10 x 5\"     Wand bench press 15x 1#    Wand shoulder flexion 15x 1#                                                                            Manual   Joint mobilization- inferior glide, anterior glide, posterior glide grade 2-3; PROM in conjunction with mobilization     Response to treatment:  Sharp pain and guarding at end

## 2025-05-07 ENCOUNTER — HOSPITAL ENCOUNTER (OUTPATIENT)
Age: 65
Setting detail: THERAPIES SERIES
Discharge: HOME | End: 2025-05-07
Payer: COMMERCIAL

## 2025-05-07 PROCEDURE — 97110 THERAPEUTIC EXERCISES: CPT

## 2025-05-07 PROCEDURE — 97140 MANUAL THERAPY 1/> REGIONS: CPT

## 2025-05-07 NOTE — FLOWSHEET NOTE
[] Demo  [] Written  Comprehension of Education:  [x] Verbalizes understanding.  [] Demonstrates understanding.  [] Needs review.  [] Demonstrates/verbalizes HEP/Ed previously given.      Access Code: [email or web address]  URL: [email or web address]  Date: 04/28/2025  Prepared by: Casa Kessler     Exercises  - Seated Shoulder Flexion AAROM with Pulley Behind  - 1-2 x daily  - Seated Shoulder External Rotation AAROM with Dowel  - 1-2 x daily - 10-15 reps - 5 hold  - Seated Elbow Flexion and Extension AROM  - 1-2 x daily - 15 reps     5/7/25 wand extension    Plan: [x] Continue current frequency toward long and short term goals.    [x] Specific Instructions for subsequent treatments: progress strengthening as appropriate per pain, work on mobility       Time In: 0930           Time Out: 1020    Electronically signed by:  Casa Kessler PT

## 2025-05-12 ENCOUNTER — HOSPITAL ENCOUNTER (OUTPATIENT)
Age: 65
Setting detail: THERAPIES SERIES
Discharge: HOME | End: 2025-05-12
Payer: COMMERCIAL

## 2025-05-12 PROCEDURE — 97110 THERAPEUTIC EXERCISES: CPT

## 2025-05-12 PROCEDURE — 97140 MANUAL THERAPY 1/> REGIONS: CPT

## 2025-05-12 NOTE — FLOWSHEET NOTE
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  Doing better today as far as biceps and anterior joint pain post surgically.  Was doing too much lifting/carrying previously which was irritating soft tissue symptoms in these areas- was getting \"catch\" in anterior shoulder with pulling up pants, lifting/carrying, and with active elevation overhead.  This is better today as far as reaching behind and actively elevating the shoulder.  Therapy has consisted of more emphasis on ROM versus resisted program as she has been so sore in the biceps region with lifting/carrying at home.     Objective:         25 end range PROM sharp pain, PROM ER 80 degrees, IR 80 degrees, AAROM flexion 140 degrees    25 PROM ER 80 degrees, IR 85 degrees, AROM flexion/abduction full without catch; Apleys IR reaching ~ 1\" deficit (R) versus (L)  Did not complete resisted testing today.      Precautions: NO weight restriction per general arthroscopic protocol      Treatment provided:   CP end of treatment 10' seated- not

## 2025-05-13 NOTE — FLOWSHEET NOTE
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  States she has been avoiding and excessive lifting and limiting painful activities.      Objective:         25 PROM ER 80 degrees, IR 80 degrees, AAROM flexion 150 degrees    25 PROM ER 80 degrees, IR 85 degrees, AROM flexion/abduction full without catch; Apleys IR reaching ~ 1\" deficit (R) versus (L)  Did not complete resisted testing today.      Precautions: NO weight restriction per general arthroscopic protocol      Treatment provided:   CP end of treatment 10' supine- not counted in billable time  Exercise Reps/ Time Weight/ Level Comments   UBE 4 min  2 shirley/min 2' fwd/retro   pulleys 3'           Bicep curls supinated, neutral 15 1#    Wand ER 10 x 5\"     Wand bench press 15x 1#    Wand shoulder flexion 15x 1#          Wand extension  10 x 5\"                                                     AAROM  X 10 each   Flexion/ABD/IR/ER         Manual 10 min   Joint mobilization- inferior glide, anterior glide, posterior

## 2025-05-14 ENCOUNTER — HOSPITAL ENCOUNTER (OUTPATIENT)
Age: 65
Setting detail: THERAPIES SERIES
Discharge: HOME | End: 2025-05-14
Payer: COMMERCIAL

## 2025-05-14 PROCEDURE — 97140 MANUAL THERAPY 1/> REGIONS: CPT

## 2025-05-14 PROCEDURE — 97110 THERAPEUTIC EXERCISES: CPT

## 2025-05-19 ENCOUNTER — HOSPITAL ENCOUNTER (OUTPATIENT)
Age: 65
Setting detail: THERAPIES SERIES
Discharge: HOME | End: 2025-05-19
Payer: COMMERCIAL

## 2025-05-19 PROCEDURE — 97140 MANUAL THERAPY 1/> REGIONS: CPT

## 2025-05-19 PROCEDURE — 97110 THERAPEUTIC EXERCISES: CPT

## 2025-05-19 NOTE — FLOWSHEET NOTE
treatments)  ? Pain: improved pain levels to 0-1/10 at worst to help with general function  ? ROM: improved to full AROM flexion, abduction with minimal to no pain; IR functional reaching to 1/2\" deficit to help with reaching behind back, PROM 90 degrees IR, ER to help with functional mobility  ? Strength: 4+/5 or better to help with lifting/carrying tolerance  ? Function: improved tolerance of lifting/carrying up to 10# unilaterally with minimal to no difficulty, improved tolerance of washing hair by 90% self report, improved tolerance of donning/doffing pants by 90% self report   Independent with Home Exercise Programs               Patient goals: to be pain free and use (R) arm     Functional Assessment Used:  SPADI 102/130 78% limited  Current Status Score:  Goal Status Score 30/130 or better    Pt. Education:  [x] Yes  [] No  [x] Reviewed Prior HEP/Ed  Method of Education: [x] Verbal  [] Demo  [] Written  Comprehension of Education:  [x] Verbalizes understanding.  [] Demonstrates understanding.  [] Needs review.  [] Demonstrates/verbalizes HEP/Ed previously given.      Access Code: [email or web address]  URL: [email or web address]  Date: 04/28/2025  Prepared by: Casa Kessler     Exercises  - Seated Shoulder Flexion AAROM with Pulley Behind  - 1-2 x daily  - Seated Shoulder External Rotation AAROM with Dowel  - 1-2 x daily - 10-15 reps - 5 hold  - Seated Elbow Flexion and Extension AROM  - 1-2 x daily - 15 reps     5/7/25 wand extension    Plan: [x] Continue current frequency toward long and short term goals.    [x] Specific Instructions for subsequent treatments: progress strengthening as appropriate per pain, work on mobility   5/12/25 PROGRESS strengthening/shoulder remodeling program.  Sees physician Thursday for regularly scheduled follow up and possible return to work as .      Time In: 515          Time Out: 605    Electronically signed by:  Casa Kessler, PT

## 2025-05-22 ENCOUNTER — HOSPITAL ENCOUNTER (OUTPATIENT)
Age: 65
Setting detail: THERAPIES SERIES
Discharge: HOME | End: 2025-05-22
Payer: COMMERCIAL

## 2025-05-22 NOTE — CARE COORDINATION
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    Electronically signed by:  Casa Kessler PT

## 2025-05-23 ENCOUNTER — APPOINTMENT (OUTPATIENT)
Age: 65
End: 2025-05-23
Payer: COMMERCIAL

## 2025-05-28 ENCOUNTER — HOSPITAL ENCOUNTER (OUTPATIENT)
Age: 65
Setting detail: THERAPIES SERIES
Discharge: HOME | End: 2025-05-28
Payer: COMMERCIAL

## 2025-05-28 NOTE — FLOWSHEET NOTE
[facility] Rehabilitation &  Therapy  [address]  [place] [address]  [phone]  F: [phone]     Physical Therapy Cancel/No Show note    Date: 2025  Patient: Sarah Alvarez  : 1960  MRN: [id number]    Cancels/No Shows to date:     For today's appointment patient:    [x]  Cancelled    [] Rescheduled appointment    [] No-show     Reason given by patient:    []  Patient ill    []  Conflicting appointment    [] No transportation      [x] Conflict with work    [] No reason given    [] Weather related    [] COVID-19    [x] Other:      Comments:  Pt left message, she will call back to reschedule      [] Next appointment was confirmed    Electronically signed by: Jonas Patterson PTA

## 2025-06-02 ENCOUNTER — HOSPITAL ENCOUNTER (OUTPATIENT)
Age: 65
Setting detail: THERAPIES SERIES
Discharge: HOME | End: 2025-06-02

## 2025-06-02 NOTE — FLOWSHEET NOTE
[facility] Rehabilitation &  Therapy  [address]  [place] [address]  [phone]  F: [phone]     Physical Therapy Cancel/No Show note    Date: 2025  Patient: Sarah Alvarez  : 1960  MRN: [id number]    Cancels/No Shows to date: 3/0    For today's appointment patient:    [x]  Cancelled    [] Rescheduled appointment    [] No-show     Reason given by patient:    []  Patient ill    []  Conflicting appointment    [] No transportation      [x] Conflict with work    [] No reason given    [] Weather related    [] COVID-19    [x] Other:      Comments: Pt left a message stating that she will call to reschedule       [] Next appointment was confirmed    Electronically signed by: Jonas Patterson PTA

## (undated) DEVICE — DEFENDO AIR WATER SUCTION AND BIOPSY VALVE KIT FOR  OLYMPUS: Brand: DEFENDO AIR/WATER/SUCTION AND BIOPSY VALVE

## (undated) DEVICE — ENDO KIT W/SYRINGE: Brand: MEDLINE INDUSTRIES, INC.

## (undated) DEVICE — BITEBLOCK 54FR W/ DENT RIM BLOX

## (undated) DEVICE — GLOVE ORANGE PI 7   MSG9070